# Patient Record
Sex: MALE | Race: BLACK OR AFRICAN AMERICAN | Employment: OTHER | URBAN - METROPOLITAN AREA
[De-identification: names, ages, dates, MRNs, and addresses within clinical notes are randomized per-mention and may not be internally consistent; named-entity substitution may affect disease eponyms.]

---

## 2017-02-09 ENCOUNTER — OP HISTORICAL/CONVERTED ENCOUNTER (OUTPATIENT)
Dept: OTHER | Age: 82
End: 2017-02-09

## 2017-08-11 ENCOUNTER — OP HISTORICAL/CONVERTED ENCOUNTER (OUTPATIENT)
Dept: OTHER | Age: 82
End: 2017-08-11

## 2018-02-06 ENCOUNTER — OP HISTORICAL/CONVERTED ENCOUNTER (OUTPATIENT)
Dept: OTHER | Age: 83
End: 2018-02-06

## 2018-05-17 ENCOUNTER — OP HISTORICAL/CONVERTED ENCOUNTER (OUTPATIENT)
Dept: OTHER | Age: 83
End: 2018-05-17

## 2018-07-09 ENCOUNTER — OP HISTORICAL/CONVERTED ENCOUNTER (OUTPATIENT)
Dept: OTHER | Age: 83
End: 2018-07-09

## 2018-11-15 ENCOUNTER — OP HISTORICAL/CONVERTED ENCOUNTER (OUTPATIENT)
Dept: OTHER | Age: 83
End: 2018-11-15

## 2019-03-18 ENCOUNTER — OP HISTORICAL/CONVERTED ENCOUNTER (OUTPATIENT)
Dept: OTHER | Age: 84
End: 2019-03-18

## 2020-03-11 ENCOUNTER — OP HISTORICAL/CONVERTED ENCOUNTER (OUTPATIENT)
Dept: OTHER | Age: 85
End: 2020-03-11

## 2022-07-08 ENCOUNTER — TRANSCRIBE ORDER (OUTPATIENT)
Dept: SCHEDULING | Age: 87
End: 2022-07-08

## 2022-07-08 DIAGNOSIS — I77.811 ABDOMINAL AORTIC ECTASIA (HCC): Primary | ICD-10-CM

## 2022-07-08 DIAGNOSIS — I71.40 AAA (ABDOMINAL AORTIC ANEURYSM): Primary | ICD-10-CM

## 2022-07-08 DIAGNOSIS — I77.811 ABDOMINAL AORTIC ECTASIA (HCC): ICD-10-CM

## 2022-07-13 ENCOUNTER — HOSPITAL ENCOUNTER (OUTPATIENT)
Dept: ULTRASOUND IMAGING | Age: 87
Discharge: HOME | End: 2022-07-13
Payer: MEDICARE

## 2022-07-13 DIAGNOSIS — I71.40 AAA (ABDOMINAL AORTIC ANEURYSM): ICD-10-CM

## 2022-07-13 DIAGNOSIS — I77.811 ABDOMINAL AORTIC ECTASIA (HCC): ICD-10-CM

## 2022-07-13 PROCEDURE — 76775 US EXAM ABDO BACK WALL LIM: CPT

## 2024-10-24 ENCOUNTER — HOSPITAL ENCOUNTER (OUTPATIENT)
Facility: HOSPITAL | Age: 89
Discharge: HOME | End: 2024-10-27
Payer: MEDICARE

## 2024-10-24 DIAGNOSIS — I71.20 THORACIC AORTIC ANEURYSM WITHOUT RUPTURE, UNSPECIFIED PART (HCC): ICD-10-CM

## 2024-10-24 PROCEDURE — 71260 CT THORAX DX C+: CPT

## 2024-10-24 PROCEDURE — 6360000004 HC RX CONTRAST MEDICATION: Performed by: FAMILY MEDICINE

## 2024-10-24 RX ORDER — IOPAMIDOL 755 MG/ML
100 INJECTION, SOLUTION INTRAVASCULAR
Status: COMPLETED | OUTPATIENT
Start: 2024-10-24 | End: 2024-10-24

## 2024-10-24 RX ADMIN — IOPAMIDOL 100 ML: 755 INJECTION, SOLUTION INTRAVENOUS at 10:56

## 2025-02-11 ENCOUNTER — APPOINTMENT (OUTPATIENT)
Facility: HOSPITAL | Age: 89
End: 2025-02-11
Payer: MEDICARE

## 2025-02-11 ENCOUNTER — HOSPITAL ENCOUNTER (EMERGENCY)
Facility: HOSPITAL | Age: 89
Discharge: HOME | End: 2025-02-11
Attending: STUDENT IN AN ORGANIZED HEALTH CARE EDUCATION/TRAINING PROGRAM
Payer: MEDICARE

## 2025-02-11 VITALS
WEIGHT: 198 LBS | BODY MASS INDEX: 27.72 KG/M2 | RESPIRATION RATE: 16 BRPM | HEIGHT: 71 IN | OXYGEN SATURATION: 98 % | SYSTOLIC BLOOD PRESSURE: 168 MMHG | TEMPERATURE: 97.6 F | DIASTOLIC BLOOD PRESSURE: 84 MMHG | HEART RATE: 65 BPM

## 2025-02-11 DIAGNOSIS — S30.1XXA CONTUSION OF FLANK, INITIAL ENCOUNTER: Primary | ICD-10-CM

## 2025-02-11 PROCEDURE — 99283 EMERGENCY DEPT VISIT LOW MDM: CPT

## 2025-02-11 ASSESSMENT — PAIN - FUNCTIONAL ASSESSMENT: PAIN_FUNCTIONAL_ASSESSMENT: 0-10

## 2025-02-11 ASSESSMENT — LIFESTYLE VARIABLES
HOW MANY STANDARD DRINKS CONTAINING ALCOHOL DO YOU HAVE ON A TYPICAL DAY: PATIENT DOES NOT DRINK
HOW OFTEN DO YOU HAVE A DRINK CONTAINING ALCOHOL: NEVER

## 2025-02-11 ASSESSMENT — PAIN SCALES - GENERAL: PAINLEVEL_OUTOF10: 0

## 2025-02-11 NOTE — ED PROVIDER NOTES
Interpersonal Safety: Not At Risk (2/11/2025)    Interpersonal Safety Domain Source: IP Abuse Screening     Physical abuse: Denies     Verbal abuse: Denies     Emotional abuse: Denies     Financial abuse: Denies     Sexual abuse: Denies   Utilities: Not on file       PHYSICAL EXAM   Physical Exam  HENT:      Head: Normocephalic and atraumatic.      Mouth/Throat:      Mouth: Mucous membranes are moist.   Eyes:      Extraocular Movements: Extraocular movements intact.      Pupils: Pupils are equal, round, and reactive to light.   Cardiovascular:      Rate and Rhythm: Normal rate and regular rhythm.   Pulmonary:      Effort: Pulmonary effort is normal.      Breath sounds: Normal breath sounds.   Abdominal:      Palpations: Abdomen is soft.      Tenderness: There is no abdominal tenderness.   Skin:     General: Skin is warm and dry.      Comments: Abrasion to left flank   Neurological:      General: No focal deficit present.      Mental Status: He is alert.           SCREENINGS                   LAB, EKG AND DIAGNOSTIC RESULTS   Labs:  No results found for this or any previous visit (from the past 12 hour(s)).    EKG: Initial EKG interpreted by me if performed. See ED course below    Radiologic Studies:  Non-plain film images such as CT, Ultrasound and MRI are read by the radiologist. Plain radiographic images are visualized and preliminarily interpreted by the ED Provider with findings available in ED course below.     Interpretation per the Radiologist below, if available at the time of this note:  No orders to display        EMERGENCY DEPARTMENT COURSE and DIFFERENTIAL DIAGNOSIS/MDM   CC/HPI Summary, MDM, ED Course, and Reassessment: MDM: 89-year-old male presents for evaluation of left sided pain after fall.  Physical exam with left-sided flank abrasion, no tenderness to palpation in the hip, knee, chest wall that he is overall well-appearing.  Considered x-ray of the hip knee and left-sided ribs due to concern

## 2025-02-11 NOTE — ED TRIAGE NOTES
Pt from home. Family states he fell this morning. Pt denies. Deformity on left  knee from swelling. Hx  dementia. Pt denies pain. Daughter is on the way, pt is poor historian. Pt ambulates with walker

## 2025-04-01 ENCOUNTER — APPOINTMENT (OUTPATIENT)
Facility: HOSPITAL | Age: 89
DRG: 065 | End: 2025-04-01
Payer: MEDICARE

## 2025-04-01 ENCOUNTER — HOSPITAL ENCOUNTER (INPATIENT)
Facility: HOSPITAL | Age: 89
LOS: 2 days | Discharge: NURSING, REHAB OR LONG-TERM CARE | DRG: 065 | End: 2025-04-03
Attending: STUDENT IN AN ORGANIZED HEALTH CARE EDUCATION/TRAINING PROGRAM
Payer: MEDICARE

## 2025-04-01 DIAGNOSIS — I63.9 CEREBROVASCULAR ACCIDENT (CVA), UNSPECIFIED MECHANISM (HCC): Primary | ICD-10-CM

## 2025-04-01 DIAGNOSIS — E83.51 HYPOCALCEMIA: ICD-10-CM

## 2025-04-01 LAB
ALBUMIN SERPL-MCNC: 2.9 G/DL (ref 3.5–5)
ALBUMIN/GLOB SERPL: 1 (ref 1.1–2.2)
ALP SERPL-CCNC: 63 U/L (ref 45–117)
ALT SERPL-CCNC: 16 U/L (ref 12–78)
AMMONIA PLAS-SCNC: <10 UMOL/L
ANION GAP SERPL CALC-SCNC: 5 MMOL/L (ref 2–12)
AST SERPL W P-5'-P-CCNC: 20 U/L (ref 15–37)
BASOPHILS # BLD: 0.05 K/UL (ref 0–0.1)
BASOPHILS NFR BLD: 0.5 % (ref 0–1)
BILIRUB SERPL-MCNC: 0.7 MG/DL (ref 0.2–1)
BUN SERPL-MCNC: 24 MG/DL (ref 6–20)
BUN/CREAT SERPL: 26 (ref 12–20)
CA-I BLD-MCNC: 7 MG/DL (ref 8.5–10.1)
CHLORIDE SERPL-SCNC: 110 MMOL/L (ref 97–108)
CO2 SERPL-SCNC: 24 MMOL/L (ref 21–32)
CREAT SERPL-MCNC: 0.92 MG/DL (ref 0.7–1.3)
DIFFERENTIAL METHOD BLD: ABNORMAL
EOSINOPHIL # BLD: 0.02 K/UL (ref 0–0.4)
EOSINOPHIL NFR BLD: 0.2 % (ref 0–7)
ERYTHROCYTE [DISTWIDTH] IN BLOOD BY AUTOMATED COUNT: 14.8 % (ref 11.5–14.5)
GLOBULIN SER CALC-MCNC: 2.9 G/DL (ref 2–4)
GLUCOSE SERPL-MCNC: 109 MG/DL (ref 65–100)
HCT VFR BLD AUTO: 38.4 % (ref 36.6–50.3)
HGB BLD-MCNC: 12.7 G/DL (ref 12.1–17)
IMM GRANULOCYTES # BLD AUTO: 0.05 K/UL (ref 0–0.04)
IMM GRANULOCYTES NFR BLD AUTO: 0.5 % (ref 0–0.5)
INR PPP: 1.2 (ref 0.9–1.1)
LYMPHOCYTES # BLD: 0.95 K/UL (ref 0.8–3.5)
LYMPHOCYTES NFR BLD: 10 % (ref 12–49)
MAGNESIUM SERPL-MCNC: 2.1 MG/DL (ref 1.6–2.4)
MCH RBC QN AUTO: 35.8 PG (ref 26–34)
MCHC RBC AUTO-ENTMCNC: 33.1 G/DL (ref 30–36.5)
MCV RBC AUTO: 108.2 FL (ref 80–99)
MONOCYTES # BLD: 0.42 K/UL (ref 0–1)
MONOCYTES NFR BLD: 4.4 % (ref 5–13)
NEUTS SEG # BLD: 7.97 K/UL (ref 1.8–8)
NEUTS SEG NFR BLD: 84.4 % (ref 32–75)
NRBC # BLD: 0 K/UL (ref 0–0.01)
NRBC BLD-RTO: 0 PER 100 WBC
PLATELET # BLD AUTO: 196 K/UL (ref 150–400)
PMV BLD AUTO: 9.9 FL (ref 8.9–12.9)
POTASSIUM SERPL-SCNC: 4.3 MMOL/L (ref 3.5–5.1)
POTASSIUM SERPL-SCNC: 4.3 MMOL/L (ref 3.5–5.1)
PROT SERPL-MCNC: 5.8 G/DL (ref 6.4–8.2)
PROTHROMBIN TIME: 15.1 SEC (ref 11.9–14.6)
RBC # BLD AUTO: 3.55 M/UL (ref 4.1–5.7)
SODIUM SERPL-SCNC: 139 MMOL/L (ref 136–145)
TROPONIN I SERPL HS-MCNC: 14 NG/L (ref 0–76)
WBC # BLD AUTO: 9.5 K/UL (ref 4.1–11.1)

## 2025-04-01 PROCEDURE — 83970 ASSAY OF PARATHORMONE: CPT

## 2025-04-01 PROCEDURE — 4A03X5D MEASUREMENT OF ARTERIAL FLOW, INTRACRANIAL, EXTERNAL APPROACH: ICD-10-PCS | Performed by: RADIOLOGY

## 2025-04-01 PROCEDURE — 84439 ASSAY OF FREE THYROXINE: CPT

## 2025-04-01 PROCEDURE — 70498 CT ANGIOGRAPHY NECK: CPT

## 2025-04-01 PROCEDURE — 1100000000 HC RM PRIVATE

## 2025-04-01 PROCEDURE — 83735 ASSAY OF MAGNESIUM: CPT

## 2025-04-01 PROCEDURE — 0042T CT BRAIN PERFUSION: CPT

## 2025-04-01 PROCEDURE — 82306 VITAMIN D 25 HYDROXY: CPT

## 2025-04-01 PROCEDURE — 82397 CHEMILUMINESCENT ASSAY: CPT

## 2025-04-01 PROCEDURE — 84484 ASSAY OF TROPONIN QUANT: CPT

## 2025-04-01 PROCEDURE — 84132 ASSAY OF SERUM POTASSIUM: CPT

## 2025-04-01 PROCEDURE — 6370000000 HC RX 637 (ALT 250 FOR IP): Performed by: STUDENT IN AN ORGANIZED HEALTH CARE EDUCATION/TRAINING PROGRAM

## 2025-04-01 PROCEDURE — 70450 CT HEAD/BRAIN W/O DYE: CPT

## 2025-04-01 PROCEDURE — 6360000004 HC RX CONTRAST MEDICATION: Performed by: STUDENT IN AN ORGANIZED HEALTH CARE EDUCATION/TRAINING PROGRAM

## 2025-04-01 PROCEDURE — 96365 THER/PROPH/DIAG IV INF INIT: CPT

## 2025-04-01 PROCEDURE — 85025 COMPLETE CBC W/AUTO DIFF WBC: CPT

## 2025-04-01 PROCEDURE — 36415 COLL VENOUS BLD VENIPUNCTURE: CPT

## 2025-04-01 PROCEDURE — 85610 PROTHROMBIN TIME: CPT

## 2025-04-01 PROCEDURE — 84443 ASSAY THYROID STIM HORMONE: CPT

## 2025-04-01 PROCEDURE — 80053 COMPREHEN METABOLIC PANEL: CPT

## 2025-04-01 PROCEDURE — 99285 EMERGENCY DEPT VISIT HI MDM: CPT

## 2025-04-01 PROCEDURE — 82140 ASSAY OF AMMONIA: CPT

## 2025-04-01 PROCEDURE — 93005 ELECTROCARDIOGRAM TRACING: CPT | Performed by: STUDENT IN AN ORGANIZED HEALTH CARE EDUCATION/TRAINING PROGRAM

## 2025-04-01 PROCEDURE — 6360000002 HC RX W HCPCS: Performed by: STUDENT IN AN ORGANIZED HEALTH CARE EDUCATION/TRAINING PROGRAM

## 2025-04-01 PROCEDURE — 82652 VIT D 1 25-DIHYDROXY: CPT

## 2025-04-01 RX ORDER — ONDANSETRON 2 MG/ML
4 INJECTION INTRAMUSCULAR; INTRAVENOUS EVERY 6 HOURS PRN
Status: DISCONTINUED | OUTPATIENT
Start: 2025-04-01 | End: 2025-04-03 | Stop reason: HOSPADM

## 2025-04-01 RX ORDER — POLYETHYLENE GLYCOL 3350 17 G/17G
17 POWDER, FOR SOLUTION ORAL DAILY PRN
Status: DISCONTINUED | OUTPATIENT
Start: 2025-04-01 | End: 2025-04-03 | Stop reason: HOSPADM

## 2025-04-01 RX ORDER — ASPIRIN 300 MG/1
300 SUPPOSITORY RECTAL DAILY
Status: DISCONTINUED | OUTPATIENT
Start: 2025-04-02 | End: 2025-04-03

## 2025-04-01 RX ORDER — IOPAMIDOL 755 MG/ML
100 INJECTION, SOLUTION INTRAVASCULAR
Status: COMPLETED | OUTPATIENT
Start: 2025-04-01 | End: 2025-04-01

## 2025-04-01 RX ORDER — ASPIRIN 81 MG/1
81 TABLET, CHEWABLE ORAL DAILY
Status: DISCONTINUED | OUTPATIENT
Start: 2025-04-02 | End: 2025-04-03

## 2025-04-01 RX ORDER — ASPIRIN 325 MG
325 TABLET ORAL
Status: COMPLETED | OUTPATIENT
Start: 2025-04-01 | End: 2025-04-01

## 2025-04-01 RX ORDER — SODIUM CHLORIDE 0.9 % (FLUSH) 0.9 %
5-40 SYRINGE (ML) INJECTION EVERY 12 HOURS SCHEDULED
Status: DISCONTINUED | OUTPATIENT
Start: 2025-04-01 | End: 2025-04-03 | Stop reason: HOSPADM

## 2025-04-01 RX ORDER — ONDANSETRON 4 MG/1
4 TABLET, ORALLY DISINTEGRATING ORAL EVERY 8 HOURS PRN
Status: DISCONTINUED | OUTPATIENT
Start: 2025-04-01 | End: 2025-04-03 | Stop reason: HOSPADM

## 2025-04-01 RX ORDER — ATORVASTATIN CALCIUM 40 MG/1
80 TABLET, FILM COATED ORAL NIGHTLY
Status: DISCONTINUED | OUTPATIENT
Start: 2025-04-01 | End: 2025-04-03 | Stop reason: HOSPADM

## 2025-04-01 RX ORDER — CALCIUM GLUCONATE 20 MG/ML
1000 INJECTION, SOLUTION INTRAVENOUS ONCE
Status: COMPLETED | OUTPATIENT
Start: 2025-04-01 | End: 2025-04-02

## 2025-04-01 RX ORDER — SODIUM CHLORIDE 9 MG/ML
INJECTION, SOLUTION INTRAVENOUS PRN
Status: DISCONTINUED | OUTPATIENT
Start: 2025-04-01 | End: 2025-04-03 | Stop reason: HOSPADM

## 2025-04-01 RX ORDER — CALCIUM GLUCONATE 20 MG/ML
1000 INJECTION, SOLUTION INTRAVENOUS ONCE
Status: DISCONTINUED | OUTPATIENT
Start: 2025-04-01 | End: 2025-04-01

## 2025-04-01 RX ORDER — SODIUM CHLORIDE 0.9 % (FLUSH) 0.9 %
5-40 SYRINGE (ML) INJECTION PRN
Status: DISCONTINUED | OUTPATIENT
Start: 2025-04-01 | End: 2025-04-03 | Stop reason: HOSPADM

## 2025-04-01 RX ADMIN — IOPAMIDOL 100 ML: 755 INJECTION, SOLUTION INTRAVENOUS at 18:27

## 2025-04-01 RX ADMIN — CALCIUM GLUCONATE 1000 MG: 20 INJECTION, SOLUTION INTRAVENOUS at 21:29

## 2025-04-01 RX ADMIN — ASPIRIN 325 MG: 325 TABLET ORAL at 21:27

## 2025-04-01 ASSESSMENT — LIFESTYLE VARIABLES
HOW OFTEN DO YOU HAVE A DRINK CONTAINING ALCOHOL: NEVER
HOW MANY STANDARD DRINKS CONTAINING ALCOHOL DO YOU HAVE ON A TYPICAL DAY: PATIENT DOES NOT DRINK

## 2025-04-01 ASSESSMENT — PAIN - FUNCTIONAL ASSESSMENT: PAIN_FUNCTIONAL_ASSESSMENT: NONE - DENIES PAIN

## 2025-04-01 NOTE — ED TRIAGE NOTES
Per ems: Sunday started with weakness, slurred speech, and facial droop, got worse at 1500. Slurred speech still, and a&o x1 not his normal.

## 2025-04-02 ENCOUNTER — APPOINTMENT (OUTPATIENT)
Facility: HOSPITAL | Age: 89
DRG: 065 | End: 2025-04-02
Payer: MEDICARE

## 2025-04-02 LAB
25(OH)D3 SERPL-MCNC: 29.4 NG/ML (ref 30–100)
ALBUMIN SERPL-MCNC: 3.1 G/DL (ref 3.5–5)
ALBUMIN/GLOB SERPL: 0.8 (ref 1.1–2.2)
ALP SERPL-CCNC: 71 U/L (ref 45–117)
ALT SERPL-CCNC: 17 U/L (ref 12–78)
AMPHET UR QL SCN: NEGATIVE
ANION GAP SERPL CALC-SCNC: 6 MMOL/L (ref 2–12)
APPEARANCE UR: CLEAR
AST SERPL W P-5'-P-CCNC: 15 U/L (ref 15–37)
BACTERIA URNS QL MICRO: NEGATIVE /HPF
BARBITURATES UR QL SCN: NEGATIVE
BASOPHILS # BLD: 0.07 K/UL (ref 0–0.1)
BASOPHILS NFR BLD: 1 % (ref 0–1)
BENZODIAZ UR QL: NEGATIVE
BILIRUB SERPL-MCNC: 1 MG/DL (ref 0.2–1)
BILIRUB UR QL: NEGATIVE
BUN SERPL-MCNC: 21 MG/DL (ref 6–20)
BUN/CREAT SERPL: 23 (ref 12–20)
CA-I BLD-MCNC: 10.3 MG/DL (ref 8.5–10.1)
CA-I BLD-MCNC: 8.8 MG/DL (ref 8.5–10.1)
CANNABINOIDS UR QL SCN: NEGATIVE
CHLORIDE SERPL-SCNC: 111 MMOL/L (ref 97–108)
CHOLEST SERPL-MCNC: 127 MG/DL
CO2 SERPL-SCNC: 26 MMOL/L (ref 21–32)
COCAINE UR QL SCN: NEGATIVE
COLOR UR: ABNORMAL
CREAT SERPL-MCNC: 0.9 MG/DL (ref 0.7–1.3)
DIFFERENTIAL METHOD BLD: ABNORMAL
ECHO AO ASC DIAM: 3.9 CM
ECHO AO ASCENDING AORTA INDEX: 1.87 CM/M2
ECHO AO ROOT DIAM: 4.4 CM
ECHO AO ROOT INDEX: 2.11 CM/M2
ECHO BSA: 2.1 M2
ECHO LA DIAMETER INDEX: 1.72 CM/M2
ECHO LA DIAMETER: 3.6 CM
ECHO LA TO AORTIC ROOT RATIO: 0.82
ECHO LV EDV A4C: 100 ML
ECHO LV EDV INDEX A4C: 48 ML/M2
ECHO LV EF PHYSICIAN: 52 %
ECHO LV EJECTION FRACTION A4C: 57 %
ECHO LV ESV A4C: 43 ML
ECHO LV ESV INDEX A4C: 21 ML/M2
ECHO LV FRACTIONAL SHORTENING: 30 % (ref 28–44)
ECHO LV INTERNAL DIMENSION DIASTOLE INDEX: 1.91 CM/M2
ECHO LV INTERNAL DIMENSION DIASTOLIC: 4 CM (ref 4.2–5.9)
ECHO LV INTERNAL DIMENSION SYSTOLIC INDEX: 1.34 CM/M2
ECHO LV INTERNAL DIMENSION SYSTOLIC: 2.8 CM
ECHO LV IVSD: 1.3 CM (ref 0.6–1)
ECHO LV MASS 2D: 197.6 G (ref 88–224)
ECHO LV MASS INDEX 2D: 94.5 G/M2 (ref 49–115)
ECHO LV POSTERIOR WALL DIASTOLIC: 1.4 CM (ref 0.6–1)
ECHO LV RELATIVE WALL THICKNESS RATIO: 0.7
ECHO LVOT AREA: 4.2 CM2
ECHO LVOT DIAM: 2.3 CM
ECHO MV MAX VELOCITY: 1 M/S
ECHO MV MEAN GRADIENT: 2 MMHG
ECHO MV MEAN VELOCITY: 0.6 M/S
ECHO MV PEAK GRADIENT: 4 MMHG
ECHO MV REGURGITANT PEAK GRADIENT: 64 MMHG
ECHO MV REGURGITANT PEAK VELOCITY: 4 M/S
ECHO MV REGURGITANT VTIA: 89.9 CM
ECHO MV VTI: 26.4 CM
ECHO PULMONARY ARTERY END DIASTOLIC PRESSURE: 4 MMHG
ECHO PV MAX VELOCITY: 0.6 M/S
ECHO PV MEAN GRADIENT: 1 MMHG
ECHO PV MEAN VELOCITY: 0.5 M/S
ECHO PV PEAK GRADIENT: 2 MMHG
ECHO PV REGURGITANT MAX VELOCITY: 1 M/S
ECHO PV VTI: 14.8 CM
ECHO TV REGURGITANT MAX VELOCITY: 1.98 M/S
ECHO TV REGURGITANT PEAK GRADIENT: 16 MMHG
EKG ATRIAL RATE: 264 BPM
EKG DIAGNOSIS: NORMAL
EKG P AXIS: -16 DEGREES
EKG Q-T INTERVAL: 500 MS
EKG QRS DURATION: 152 MS
EKG QTC CALCULATION (BAZETT): 437 MS
EKG R AXIS: -59 DEGREES
EKG T AXIS: -45 DEGREES
EKG VENTRICULAR RATE: 46 BPM
EOSINOPHIL # BLD: 0.08 K/UL (ref 0–0.4)
EOSINOPHIL NFR BLD: 1.2 % (ref 0–7)
EPITH CASTS URNS QL MICRO: ABNORMAL /LPF
ERYTHROCYTE [DISTWIDTH] IN BLOOD BY AUTOMATED COUNT: 14.5 % (ref 11.5–14.5)
EST. AVERAGE GLUCOSE BLD GHB EST-MCNC: 163 MG/DL
FOLATE SERPL-MCNC: 12.2 NG/ML (ref 5–21)
GLOBULIN SER CALC-MCNC: 3.7 G/DL (ref 2–4)
GLUCOSE BLD STRIP.AUTO-MCNC: 139 MG/DL (ref 65–100)
GLUCOSE BLD STRIP.AUTO-MCNC: 166 MG/DL (ref 65–100)
GLUCOSE BLD STRIP.AUTO-MCNC: 170 MG/DL (ref 65–100)
GLUCOSE SERPL-MCNC: 108 MG/DL (ref 65–100)
GLUCOSE UR STRIP.AUTO-MCNC: NEGATIVE MG/DL
HBA1C MFR BLD: 7.3 % (ref 4–5.6)
HCT VFR BLD AUTO: 36.1 % (ref 36.6–50.3)
HDLC SERPL-MCNC: 41 MG/DL
HDLC SERPL: 3.1 (ref 0–5)
HGB BLD-MCNC: 12.1 G/DL (ref 12.1–17)
HGB UR QL STRIP: ABNORMAL
HYALINE CASTS URNS QL MICRO: ABNORMAL /LPF (ref 0–5)
IMM GRANULOCYTES # BLD AUTO: 0.02 K/UL (ref 0–0.04)
IMM GRANULOCYTES NFR BLD AUTO: 0.3 % (ref 0–0.5)
KETONES UR QL STRIP.AUTO: 20 MG/DL
LDLC SERPL CALC-MCNC: 73.2 MG/DL (ref 0–100)
LEUKOCYTE ESTERASE UR QL STRIP.AUTO: ABNORMAL
LIPID PANEL: NORMAL
LYMPHOCYTES # BLD: 1.54 K/UL (ref 0.8–3.5)
LYMPHOCYTES NFR BLD: 22.2 % (ref 12–49)
Lab: NORMAL
MAGNESIUM SERPL-MCNC: 2.1 MG/DL (ref 1.6–2.4)
MCH RBC QN AUTO: 36 PG (ref 26–34)
MCHC RBC AUTO-ENTMCNC: 33.5 G/DL (ref 30–36.5)
MCV RBC AUTO: 107.4 FL (ref 80–99)
METHADONE UR QL: NEGATIVE
MONOCYTES # BLD: 0.76 K/UL (ref 0–1)
MONOCYTES NFR BLD: 11 % (ref 5–13)
MUCOUS THREADS URNS QL MICRO: ABNORMAL /LPF
NEUTS SEG # BLD: 4.46 K/UL (ref 1.8–8)
NEUTS SEG NFR BLD: 64.3 % (ref 32–75)
NITRITE UR QL STRIP.AUTO: NEGATIVE
NRBC # BLD: 0 K/UL (ref 0–0.01)
NRBC BLD-RTO: 0 PER 100 WBC
OPIATES UR QL: NEGATIVE
PCP UR QL: NEGATIVE
PERFORMED BY:: ABNORMAL
PH UR STRIP: 5 (ref 5–8)
PHOSPHATE SERPL-MCNC: 3.5 MG/DL (ref 2.6–4.7)
PLATELET # BLD AUTO: 185 K/UL (ref 150–400)
PMV BLD AUTO: 10.1 FL (ref 8.9–12.9)
POTASSIUM SERPL-SCNC: 3.7 MMOL/L (ref 3.5–5.1)
PROT SERPL-MCNC: 6.8 G/DL (ref 6.4–8.2)
PROT UR STRIP-MCNC: 30 MG/DL
PTH-INTACT SERPL-MCNC: 38.1 PG/ML (ref 18.4–88)
RBC # BLD AUTO: 3.36 M/UL (ref 4.1–5.7)
RBC #/AREA URNS HPF: ABNORMAL /HPF (ref 0–5)
SODIUM SERPL-SCNC: 143 MMOL/L (ref 136–145)
SP GR UR REFRACTOMETRY: >1.03 (ref 1–1.03)
T4 FREE SERPL-MCNC: 1.5 NG/DL (ref 0.8–1.5)
TRIGL SERPL-MCNC: 64 MG/DL
TSH SERPL DL<=0.05 MIU/L-ACNC: 1.84 UIU/ML (ref 0.36–3.74)
URINE CULTURE IF INDICATED: ABNORMAL
UROBILINOGEN UR QL STRIP.AUTO: 0.1 EU/DL (ref 0.1–1)
VIT B12 SERPL-MCNC: 302 PG/ML (ref 193–986)
VLDLC SERPL CALC-MCNC: 12.8 MG/DL
WBC # BLD AUTO: 6.9 K/UL (ref 4.1–11.1)
WBC URNS QL MICRO: ABNORMAL /HPF (ref 0–4)

## 2025-04-02 PROCEDURE — 70551 MRI BRAIN STEM W/O DYE: CPT

## 2025-04-02 PROCEDURE — 80053 COMPREHEN METABOLIC PANEL: CPT

## 2025-04-02 PROCEDURE — 6360000002 HC RX W HCPCS

## 2025-04-02 PROCEDURE — 36415 COLL VENOUS BLD VENIPUNCTURE: CPT

## 2025-04-02 PROCEDURE — 2580000003 HC RX 258

## 2025-04-02 PROCEDURE — 83735 ASSAY OF MAGNESIUM: CPT

## 2025-04-02 PROCEDURE — 97530 THERAPEUTIC ACTIVITIES: CPT

## 2025-04-02 PROCEDURE — 85025 COMPLETE CBC W/AUTO DIFF WBC: CPT

## 2025-04-02 PROCEDURE — 80307 DRUG TEST PRSMV CHEM ANLYZR: CPT

## 2025-04-02 PROCEDURE — 93321 DOPPLER ECHO F-UP/LMTD STD: CPT

## 2025-04-02 PROCEDURE — 2500000003 HC RX 250 WO HCPCS

## 2025-04-02 PROCEDURE — 84100 ASSAY OF PHOSPHORUS: CPT

## 2025-04-02 PROCEDURE — 97161 PT EVAL LOW COMPLEX 20 MIN: CPT

## 2025-04-02 PROCEDURE — 82746 ASSAY OF FOLIC ACID SERUM: CPT

## 2025-04-02 PROCEDURE — 84443 ASSAY THYROID STIM HORMONE: CPT

## 2025-04-02 PROCEDURE — 82962 GLUCOSE BLOOD TEST: CPT

## 2025-04-02 PROCEDURE — 92610 EVALUATE SWALLOWING FUNCTION: CPT

## 2025-04-02 PROCEDURE — 82607 VITAMIN B-12: CPT

## 2025-04-02 PROCEDURE — 97166 OT EVAL MOD COMPLEX 45 MIN: CPT

## 2025-04-02 PROCEDURE — 6370000000 HC RX 637 (ALT 250 FOR IP)

## 2025-04-02 PROCEDURE — 81001 URINALYSIS AUTO W/SCOPE: CPT

## 2025-04-02 PROCEDURE — 83036 HEMOGLOBIN GLYCOSYLATED A1C: CPT

## 2025-04-02 PROCEDURE — 97535 SELF CARE MNGMENT TRAINING: CPT

## 2025-04-02 PROCEDURE — 80061 LIPID PANEL: CPT

## 2025-04-02 PROCEDURE — 1100000000 HC RM PRIVATE

## 2025-04-02 RX ORDER — OLMESARTAN MEDOXOMIL 20 MG/1
20 TABLET ORAL DAILY
COMMUNITY

## 2025-04-02 RX ORDER — HYDROXYUREA 500 MG/1
500 CAPSULE ORAL DAILY
Status: DISCONTINUED | OUTPATIENT
Start: 2025-04-02 | End: 2025-04-03 | Stop reason: HOSPADM

## 2025-04-02 RX ORDER — GLUCAGON 1 MG/ML
1 KIT INJECTION PRN
Status: DISCONTINUED | OUTPATIENT
Start: 2025-04-02 | End: 2025-04-03 | Stop reason: HOSPADM

## 2025-04-02 RX ORDER — HYDROXYUREA 500 MG/1
CAPSULE ORAL DAILY
COMMUNITY

## 2025-04-02 RX ORDER — ENOXAPARIN SODIUM 100 MG/ML
40 INJECTION SUBCUTANEOUS DAILY
Status: DISCONTINUED | OUTPATIENT
Start: 2025-04-02 | End: 2025-04-03 | Stop reason: HOSPADM

## 2025-04-02 RX ORDER — HYDROCODONE BITARTRATE AND ACETAMINOPHEN 5; 325 MG/1; MG/1
1 TABLET ORAL EVERY 4 HOURS PRN
Status: ON HOLD | COMMUNITY
End: 2025-04-03 | Stop reason: HOSPADM

## 2025-04-02 RX ORDER — DEXTROSE MONOHYDRATE 100 MG/ML
INJECTION, SOLUTION INTRAVENOUS CONTINUOUS PRN
Status: DISCONTINUED | OUTPATIENT
Start: 2025-04-02 | End: 2025-04-03 | Stop reason: HOSPADM

## 2025-04-02 RX ORDER — LEVOTHYROXINE SODIUM 75 UG/1
150 TABLET ORAL DAILY
Status: DISCONTINUED | OUTPATIENT
Start: 2025-04-02 | End: 2025-04-03 | Stop reason: HOSPADM

## 2025-04-02 RX ORDER — INSULIN LISPRO 100 [IU]/ML
0-4 INJECTION, SOLUTION INTRAVENOUS; SUBCUTANEOUS
Status: DISCONTINUED | OUTPATIENT
Start: 2025-04-02 | End: 2025-04-03 | Stop reason: HOSPADM

## 2025-04-02 RX ORDER — LEVOTHYROXINE SODIUM 150 UG/1
150 TABLET ORAL DAILY
COMMUNITY

## 2025-04-02 RX ADMIN — ATORVASTATIN CALCIUM 80 MG: 40 TABLET, FILM COATED ORAL at 20:29

## 2025-04-02 RX ADMIN — SODIUM CHLORIDE, PRESERVATIVE FREE 10 ML: 5 INJECTION INTRAVENOUS at 10:48

## 2025-04-02 RX ADMIN — SODIUM CHLORIDE, PRESERVATIVE FREE 10 ML: 5 INJECTION INTRAVENOUS at 01:35

## 2025-04-02 RX ADMIN — CEFTRIAXONE SODIUM 1000 MG: 1 INJECTION, POWDER, FOR SOLUTION INTRAMUSCULAR; INTRAVENOUS at 10:47

## 2025-04-02 RX ADMIN — ATORVASTATIN CALCIUM 80 MG: 40 TABLET, FILM COATED ORAL at 01:20

## 2025-04-02 RX ADMIN — SODIUM CHLORIDE: 0.9 INJECTION, SOLUTION INTRAVENOUS at 01:28

## 2025-04-02 RX ADMIN — SODIUM CHLORIDE, PRESERVATIVE FREE 10 ML: 5 INJECTION INTRAVENOUS at 20:29

## 2025-04-02 RX ADMIN — ENOXAPARIN SODIUM 40 MG: 100 INJECTION SUBCUTANEOUS at 17:19

## 2025-04-02 RX ADMIN — CALCIUM GLUCONATE 1000 MG: 20 INJECTION, SOLUTION INTRAVENOUS at 01:30

## 2025-04-02 ASSESSMENT — ENCOUNTER SYMPTOMS: RESPIRATORY NEGATIVE: 1

## 2025-04-02 NOTE — CARE COORDINATION
04/02/25 1310   Service Assessment   Patient Orientation Unable to Assess  (Information provided via family at bedside)   Cognition Other (see comment)  (Information provided via family at bedside)   History Provided By Spouse;Child/Family   Primary Caregiver Family   Support Systems Children;Spouse/Significant Other   Patient's Healthcare Decision Maker is: Legal Next of Kin   PCP Verified by CM Yes   Last Visit to PCP Within last 3 months   Prior Functional Level Assistance with the following:;Bathing;Toileting;Dressing;Cooking;Mobility;Shopping;Housework   Current Functional Level Assistance with the following:;Bathing;Dressing;Toileting;Cooking;Mobility;Shopping;Housework   Can patient return to prior living arrangement Yes   Ability to make needs known: Fair   Family able to assist with home care needs: Yes   Would you like for me to discuss the discharge plan with any other family members/significant others, and if so, who? Yes  (Spouse, Berta)   Financial Resources Medicare   Community Resources None   Social/Functional History   Lives With Spouse   Type of Home House   Home Layout One level  (4 steps to the entrance)   Home Equipment Walker - Rolling   Discharge Planning   Patient expects to be discharged to: Acute rehab   Services At/After Discharge   Transition of Care Consult (CM Consult) Discharge Planning     1310: CM met with patient, spouse, daughter (Yamileth) and sister-in-law at bedside to complete DCP assessment. Demos verified as accurate. Couple lives in a one story home with four steps to the back entrance. Physical address: [address] [place] [address]. Prior to admission, patient was requiring assistance to complete ADLS. DME: rolling walker. Open with [employer] [facility] [facility]. PT verbal recs for IRF. [facility] [place] of choice placed on chart and referrals sent. Preferred pharmacy for new medications verified as [facility] in

## 2025-04-02 NOTE — ED PROVIDER NOTES
EMERGENCY DEPARTMENT HISTORY AND PHYSICAL EXAM      Date: 4/1/2025  Patient Name: Micha Thayer  MRN: [id number]  YOB: 1934  Date of evaluation: 4/1/2025  Provider: José Manuel Galdamez MD     History of Present Illness     Chief Complaint   Patient presents with    Altered Mental Status     Per ems: Sunday started with weakness, slurred speech, and facial droop, got worse at 1500. Slurred speech still, and a&o x1 not his normal.         History Provided By: Patient, wife, ems    HPI: Micha Thayer, 89 y.o. male with past medical history as listed and reviewed below presenting to the ED for evaluation of possible stroke.  A level 1 stroke alert was called on arrival.  Patient has history of diabetes, prostate cancer, hypertension, chronic subclavian vein obstruction presenting to the ED for strokelike symptoms.  Per the wife the patient had weakness on Sunday with slurred speech and a right-sided facial droop.  Today the wife reports that the patient tried to walk and his left leg was a lot weaker than usual and he was unable to walk and almost fell.  She reports that the patient had otherwise normal therapy sessions just prior to this and she thinks it started about 3 hours ago.  Talking to the patient he he states he is having some difficulty talking, he knows his name and that he is in the hospital.  He denies any pain    Medical History     Past Medical History:  No past medical history on file.    Past Surgical History:  No past surgical history on file.    Family History:  No family history on file.    Social History:       Allergies:  No Known Allergies    PCP: Nelida Velasco DO    Current Medications:   Current Facility-Administered Medications   Medication Dose Route Frequency Provider Last Rate Last Admin    sodium chloride flush 0.9 % injection 5-40 mL  5-40 mL IntraVENous 2 times per day Hortensia Calix MD        sodium chloride flush 0.9 % injection 5-40 mL  5-40 mL IntraVENous PRN Fifi

## 2025-04-02 NOTE — ED NOTES
Family reports pt has difficulty walking and is a high risk for falls and states \"lately he gets confused at night and tries to get up but he falls, we think he has dementia but hasn't been diagnosed\".

## 2025-04-02 NOTE — CONSULTS
injection 5-40 mL, 5-40 mL, IntraVENous, PRN, Hortensia Calix MD    0.9 % sodium chloride infusion, , IntraVENous, PRN, Hortensia Calix MD, Last Rate: 5 mL/hr at 04/02/25 0128, New Bag at 04/02/25 0128    ondansetron (ZOFRAN-ODT) disintegrating tablet 4 mg, 4 mg, Oral, Q8H PRN **OR** ondansetron (ZOFRAN) injection 4 mg, 4 mg, IntraVENous, Q6H PRN, Hortensia Calix MD    polyethylene glycol (GLYCOLAX) packet 17 g, 17 g, Oral, Daily PRN, Hortensia Calix MD    atorvastatin (LIPITOR) tablet 80 mg, 80 mg, Oral, Nightly, Hortensia Calix MD, 80 mg at 04/02/25 0120    aspirin chewable tablet 81 mg, 81 mg, Oral, Daily **OR** aspirin suppository 300 mg, 300 mg, Rectal, Daily, Hortensia Calix MD     ALLERGIES:  Allergies reviewed with the patient,No Known Allergies .      FAMILY HISTORY:  Family history reviewed.        SOCIAL HISTORY:  Notable for no tobacco use, no heavy alcohol or illicit drug use.      REVIEW OF SYSTEMS:  Complete review of systems performed, pertinents noted above, all other systems are negative.    PHYSICAL EXAMINATION:    General:  Alert  Cardiovascular:  Irregularly irregular rhythm  Respiratory:  Lungs are clear  Abdomen:  Soft, nontender  Extremities:  No lower extremity edema  Skin:  Dry, warm  Psych:  Normal affect      Vitals:    04/02/25 1107   BP: (!) 152/66   Pulse: 64   Resp: 18   Temp: 97.9 °F (36.6 °C)   SpO2: 99%       Recent labs results and imaging reviewed.     Discussed case with Dr. Weiner and our impression and recommendations are as follows:  New onset atrial flutter,   Slow ventricular response   Rates 37-61 this morning  Continue telemetry   CHADsVASC score of 5. Recommend OAC at discharge when/if cleared by neurology   Check TSH, Keep electrolytes WNL, K+ 4-5, Mg >2   CVA, MRI brain with small acute infarct in the left globus pallidus/posterior limb internal capsule. Neurology following. Continue ASA/ statin    Hypertension, BP stable. Permissive HTN per neuro    UTI, IV abx 
 2-Complete Hemianopia  3-Blind f  FACIAL WEAKNESS  0-Normal   1-Minor   2-Partial   3-Complete Paralysis   MOTOR: LEFT ARM  0-No Drift   1-Drift   2-Some Effort vs Gravity   3-No Effort vs Gravity   4-No Movement m  MOTOR: RIGHT ARM  0-No Drift   1-Drift   2-Some Effort vs Gravity   3-No Effort vs Gravity   4-No Movement m  MOTOR: LEFT LEG  0-No Drift   1-Drift   2-Some Effort vs Gravity   3-No Effort vs Gravity   4-No Movement m  MOTOR: RIGHT LEG  0-No Drift   1-Drift   2-Some Effort vs Gravity   3-No Effort vs Gravity   4-No Movement   LIMB ATAXIA  0-Absent   1-One Limb   2-Two Limbs     SENSORY LOSS  0-Normal   1-Mild to Moderate   2-Severe to Total   BEST LANGUAGE  0-No Aphasia   2-Severe Aphasia   3-Mute  DYSARTHRIA  0-Normal   1-Mild to Moderate   2-Severe  EXTINCTION/INATTENTION  0-Absent  1-One Modality   2-Two+ Modalities    NIHSS Score = 6      Lab Review  Results for orders placed or performed during the hospital encounter of 04/01/25   CBC with Auto Differential   Result Value Ref Range    WBC 9.5 4.1 - 11.1 K/uL    RBC 3.55 (L) 4.10 - 5.70 M/uL    Hemoglobin 12.7 12.1 - 17.0 g/dL    Hematocrit 38.4 36.6 - 50.3 %    .2 (H) 80.0 - 99.0 FL    MCH 35.8 (H) 26.0 - 34.0 PG    MCHC 33.1 30.0 - 36.5 g/dL    RDW 14.8 (H) 11.5 - 14.5 %    Platelets 196 150 - 400 K/uL    MPV 9.9 8.9 - 12.9 FL    Nucleated RBCs 0.0 0.0  WBC    nRBC 0.00 0.00 - 0.01 K/uL    Neutrophils % 84.4 (H) 32.0 - 75.0 %    Lymphocytes % 10.0 (L) 12.0 - 49.0 %    Monocytes % 4.4 (L) 5.0 - 13.0 %    Eosinophils % 0.2 0.0 - 7.0 %    Basophils % 0.5 0.0 - 1.0 %    Immature Granulocytes % 0.5 0 - 0.5 %    Neutrophils Absolute 7.97 1.80 - 8.00 K/UL    Lymphocytes Absolute 0.95 0.80 - 3.50 K/UL    Monocytes Absolute 0.42 0.00 - 1.00 K/UL    Eosinophils Absolute 0.02 0.00 - 0.40 K/UL    Basophils Absolute 0.05 0.00 - 0.10 K/UL    Immature Granulocytes Absolute 0.05 (H) 0.00 - 0.04 K/UL    Differential Type AUTOMATED     Troponin

## 2025-04-02 NOTE — ED NOTES
ED TO INPATIENT SBAR HANDOFF    Patient Name: Micha Thayer   Preferred Name: Micha  : 9/15/1934  89 y.o.   Family/Caregiver Present: no   Code Status Order: Full Code  PO Status: NPO:No  Telemetry Order:   C-SSRS: Risk of Suicide: No Risk  Sitter no  family states hx of increased confusion at night  Restraints:     Sepsis Risk Score      Situation  Chief Complaint   Patient presents with    Altered Mental Status     Per ems:  started with weakness, slurred speech, and facial droop, got worse at 1500. Slurred speech still, and a&o x1 not his normal.       Brief Description of Patient's Condition: Pt presented to the ED with family reporting weakness, slurred speech and facial drooping that started . A full stroke work up was completed, not a candidate for TPA. Continue to monitor neuros.   Mental Status: disoriented and oriented  Arrived from:Home  Imaging:   CTA HEAD NECK W CONTRAST   Final Result   No large vessel occlusion, hemodynamically significant stenosis,   dissection, aneurysm, or abnormal perfusion.      Electronically signed by Cole Melgoza      CT BRAIN PERFUSION   Final Result   No large vessel occlusion, hemodynamically significant stenosis,   dissection, aneurysm, or abnormal perfusion.      Electronically signed by Cole Melgoza      CT HEAD WO CONTRAST   Final Result   No acute abnormality.            Electronically signed by RONNY PEDROZA      MRI brain without contrast    (Results Pending)     Abnormal labs:   Abnormal Labs Reviewed   CBC WITH AUTO DIFFERENTIAL - Abnormal; Notable for the following components:       Result Value    RBC 3.55 (*)     .2 (*)     MCH 35.8 (*)     RDW 14.8 (*)     Neutrophils % 84.4 (*)     Lymphocytes % 10.0 (*)     Monocytes % 4.4 (*)     Immature Granulocytes Absolute 0.05 (*)     All other components within normal limits   COMPREHENSIVE METABOLIC PANEL - Abnormal; Notable for the following components:    Chloride 110 (*)     Glucose 109

## 2025-04-02 NOTE — H&P
Hospitalist Admission Note    NAME: Micha Thayer   :  9/15/1934   MRN:  [id number]     Date/Time:  2025 9:57 PM    Patient PCP: Nelida Velasco, DO    ______________________________________________________________________  Given the patient's current clinical presentation, I have a high level of concern for decompensation if discharged from the emergency department.  Complex decision making was performed, which includes reviewing the patient's available past medical records, laboratory results, and x-ray films.       My assessment of this patient's clinical condition and my plan of care is as follows.    Assessment / Plan:    Slurred speech, right facial droop, right lower extremity weakness, likely 2/2 Ischemic stroke/TIA  Confusion/encephalopathy  -reviewed CT head/CTA H and neck, no intracranial bleed   -comprehension intact, so expressive aphasia noted   -allow permessive hypertension, treat if SBP > 220/120   -Start statin  -Given aspirin, continue aspirin  -ECHO with bubble study   -Obtain MRI of brain  -Tylenol for Fever   -Avoid hypoglycemia   -F/u on TSH, HbA1c, Lipid panel, U tox  -If passes swallow eval by bedside, can hold off on formal SLP  -PT/OT/SLP  -NIH 3  -Appreciate teleneurology    Bradycardia  -EKG reviewed, does not seem to have any AV block, appears irregularly irregular, with no P waves  -Could be slow firing A-fib/a flutter, per on-call cardiology  -Monitor on telemetry  -Follow-up TSH/t4  -[id number] 5, as the events today for possible TIA is taken into account  -Will wait for MRI to see if there is any embolic event  -Likely would benefit from anticoagulation, however will appreciate cardiology to weigh in for risk versus benefit for anticoagulation    Hypocalcemia  -corrected Ca is 7.9   -1 x IV calcium gluconate given   -PTH, Vitamin D, Phos   -appreciate nephrology     Diabetes mellitus  -Hold metformin  -Continue sliding scale    Hypertension  -Allow permissive

## 2025-04-03 VITALS
OXYGEN SATURATION: 98 % | HEART RATE: 69 BPM | TEMPERATURE: 97.9 F | RESPIRATION RATE: 18 BRPM | HEIGHT: 71 IN | DIASTOLIC BLOOD PRESSURE: 87 MMHG | BODY MASS INDEX: 27.3 KG/M2 | WEIGHT: 195 LBS | SYSTOLIC BLOOD PRESSURE: 137 MMHG

## 2025-04-03 LAB
1,25(OH)2D3 SERPL-MCNC: 50.5 PG/ML (ref 24.8–81.5)
BASOPHILS # BLD: 0.07 K/UL (ref 0–0.1)
BASOPHILS NFR BLD: 0.8 % (ref 0–1)
DIFFERENTIAL METHOD BLD: ABNORMAL
EOSINOPHIL # BLD: 0.09 K/UL (ref 0–0.4)
EOSINOPHIL NFR BLD: 1 % (ref 0–7)
ERYTHROCYTE [DISTWIDTH] IN BLOOD BY AUTOMATED COUNT: 14.2 % (ref 11.5–14.5)
GLUCOSE BLD STRIP.AUTO-MCNC: 118 MG/DL (ref 65–100)
GLUCOSE BLD STRIP.AUTO-MCNC: 185 MG/DL (ref 65–100)
HCT VFR BLD AUTO: 39.1 % (ref 36.6–50.3)
HGB BLD-MCNC: 12.9 G/DL (ref 12.1–17)
IMM GRANULOCYTES # BLD AUTO: 0.04 K/UL (ref 0–0.04)
IMM GRANULOCYTES NFR BLD AUTO: 0.5 % (ref 0–0.5)
LYMPHOCYTES # BLD: 1.12 K/UL (ref 0.8–3.5)
LYMPHOCYTES NFR BLD: 12.6 % (ref 12–49)
MCH RBC QN AUTO: 35.1 PG (ref 26–34)
MCHC RBC AUTO-ENTMCNC: 33 G/DL (ref 30–36.5)
MCV RBC AUTO: 106.5 FL (ref 80–99)
MONOCYTES # BLD: 0.81 K/UL (ref 0–1)
MONOCYTES NFR BLD: 9.1 % (ref 5–13)
NEUTS SEG # BLD: 6.73 K/UL (ref 1.8–8)
NEUTS SEG NFR BLD: 76 % (ref 32–75)
NRBC # BLD: 0 K/UL (ref 0–0.01)
NRBC BLD-RTO: 0 PER 100 WBC
PERFORMED BY:: ABNORMAL
PERFORMED BY:: ABNORMAL
PLATELET # BLD AUTO: 196 K/UL (ref 150–400)
PMV BLD AUTO: 10 FL (ref 8.9–12.9)
RBC # BLD AUTO: 3.67 M/UL (ref 4.1–5.7)
TSH SERPL DL<=0.05 MIU/L-ACNC: 2.14 UIU/ML (ref 0.45–4.5)
WBC # BLD AUTO: 8.9 K/UL (ref 4.1–11.1)

## 2025-04-03 PROCEDURE — 82962 GLUCOSE BLOOD TEST: CPT

## 2025-04-03 PROCEDURE — 2500000003 HC RX 250 WO HCPCS

## 2025-04-03 PROCEDURE — 6370000000 HC RX 637 (ALT 250 FOR IP)

## 2025-04-03 PROCEDURE — 6360000002 HC RX W HCPCS

## 2025-04-03 PROCEDURE — 85025 COMPLETE CBC W/AUTO DIFF WBC: CPT

## 2025-04-03 PROCEDURE — 36415 COLL VENOUS BLD VENIPUNCTURE: CPT

## 2025-04-03 PROCEDURE — 97530 THERAPEUTIC ACTIVITIES: CPT

## 2025-04-03 RX ORDER — LANOLIN ALCOHOL/MO/W.PET/CERES
1000 CREAM (GRAM) TOPICAL DAILY
Status: DISCONTINUED | OUTPATIENT
Start: 2025-04-06 | End: 2025-04-03 | Stop reason: HOSPADM

## 2025-04-03 RX ORDER — ATORVASTATIN CALCIUM 80 MG/1
80 TABLET, FILM COATED ORAL NIGHTLY
Qty: 30 TABLET | Refills: 3 | Status: SHIPPED | OUTPATIENT
Start: 2025-04-03

## 2025-04-03 RX ORDER — CYANOCOBALAMIN 1000 UG/ML
1000 INJECTION, SOLUTION INTRAMUSCULAR; SUBCUTANEOUS DAILY
Status: DISCONTINUED | OUTPATIENT
Start: 2025-04-03 | End: 2025-04-03 | Stop reason: HOSPADM

## 2025-04-03 RX ADMIN — LEVOTHYROXINE SODIUM 150 MCG: 0.07 TABLET ORAL at 05:45

## 2025-04-03 RX ADMIN — CYANOCOBALAMIN 1000 MCG: 1000 INJECTION, SOLUTION INTRAMUSCULAR; SUBCUTANEOUS at 08:31

## 2025-04-03 RX ADMIN — ASPIRIN 81 MG: 81 TABLET, CHEWABLE ORAL at 08:31

## 2025-04-03 RX ADMIN — ENOXAPARIN SODIUM 40 MG: 100 INJECTION SUBCUTANEOUS at 08:31

## 2025-04-03 RX ADMIN — CEFTRIAXONE SODIUM 1000 MG: 1 INJECTION, POWDER, FOR SOLUTION INTRAMUSCULAR; INTRAVENOUS at 08:33

## 2025-04-03 RX ADMIN — SODIUM CHLORIDE, PRESERVATIVE FREE 10 ML: 5 INJECTION INTRAVENOUS at 08:33

## 2025-04-03 RX ADMIN — INSULIN LISPRO 1 UNITS: 100 INJECTION, SOLUTION INTRAVENOUS; SUBCUTANEOUS at 12:05

## 2025-04-03 RX ADMIN — HYDROXYUREA 500 MG: 500 CAPSULE ORAL at 08:31

## 2025-04-03 ASSESSMENT — PAIN SCALES - GENERAL
PAINLEVEL_OUTOF10: 0
PAINLEVEL_OUTOF10: 0

## 2025-04-03 NOTE — PROGRESS NOTES
CARDIOLOGY PROGRESS NOTE      Patient Name: Micha Thayer  Age: 89 y.o.  Gender:male  :9/15/1934  MRN: [id number]    Patient seen and examined. This is a patient with a history of  diabetes and hypertension who presented with weakness and slurred speech now being followed for new onset atrial fibrillation/flutter. Sitting upright in bed eating lunch. Denies chest pain and shortness of breath. No other complaints reported.    Telemetry reviewed, there were no events noted in the past 24 hours.    Pertinent review of systems items noted above, all other systems are negative. Current medications reviewed.    Physical Examination    No Known Allergies  Vitals:    25 1110   BP: 137/87   Pulse: 69   Resp: 18   Temp: 97.9 °F (36.6 °C)   SpO2: 98%     Vital signs are stable  No apparent distress.  Heart has an irregularly irregular rhythm.  No murmur  Lungs are clear  Abdomen is soft, nontender, normal bowel sounds.  Extremities have no edema  Skin is dry and warm.  Normal affect    Labs reviewed:  Recent Results (from the past 12 hours)   CBC with Auto Differential    Collection Time: 25  5:14 AM   Result Value Ref Range    WBC 8.9 4.1 - 11.1 K/uL    RBC 3.67 (L) 4.10 - 5.70 M/uL    Hemoglobin 12.9 12.1 - 17.0 g/dL    Hematocrit 39.1 36.6 - 50.3 %    .5 (H) 80.0 - 99.0 FL    MCH 35.1 (H) 26.0 - 34.0 PG    MCHC 33.0 30.0 - 36.5 g/dL    RDW 14.2 11.5 - 14.5 %    Platelets 196 150 - 400 K/uL    MPV 10.0 8.9 - 12.9 FL    Nucleated RBCs 0.0 0.0  WBC    nRBC 0.00 0.00 - 0.01 K/uL    Neutrophils % 76.0 (H) 32.0 - 75.0 %    Lymphocytes % 12.6 12.0 - 49.0 %    Monocytes % 9.1 5.0 - 13.0 %    Eosinophils % 1.0 0.0 - 7.0 %    Basophils % 0.8 0.0 - 1.0 %    Immature Granulocytes % 0.5 0 - 0.5 %    Neutrophils Absolute 6.73 1.80 - 8.00 K/UL    Lymphocytes Absolute 1.12 0.80 - 3.50 K/UL    Monocytes Absolute 0.81 0.00 - 1.00 K/UL    Eosinophils Absolute 0.09 0.00 - 0.40 K/UL    Basophils Absolute 0.07 
    Hospitalist Progress Note               Daily Progress Note: 4/2/2025      Hospital Day: 2     Chief complaint:   Chief Complaint   Patient presents with    Altered Mental Status     Per ems: Sunday started with weakness, slurred speech, and facial droop, got worse at 1500. Slurred speech still, and a&o x1 not his normal.          Subjective:   Patient is seen and examined today during bedside rounds with the nurse, family is present at bedside.  Discussed with him plan of care  Patient is doing better.  He worked with physical therapy and recommend IRF.  His balance is off.  However he is focal neurological exam is not too bad.  He has full strength in his lower and upper extremities.  He is little confused.  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   Presented with slurred speech, right facial droop, right lower extremity weakness, likely 2/2  Confusion/encephalopathy, possible underlying dementia   -reviewed CT head/CTA H and neck, no intracranial bleed   -comprehension intact, so expressive aphasia noted  Weakness improved  -allow permessive hypertension, treat if SBP > 220/120   -Start statin  -Given aspirin, continue statin  -ECHO with bubble study pending  Appreciate teleneurology consult  Follow-up A1c, lipid panel     Bradycardia  Atrial flutter  -EKG reviewed, does not seem to have any AV block, appears irregularly irregular, with no P waves  -Could be slow firing A-fib/a flutter, per on-call cardiology  -Monitor on telemetry  -Follow-up TSH/t4  -[id number] 5, as the events today for possible TIA is taken into account  -acCording to the neurology no anticoagulation for the next 48 hours after stroke  Evaluated by cardiology, recommend anticoagulation in the long-term.  Reduced dose of Eliquis will be appropriate.  Discussed with the patient and the family.  Continue telemetry     Hypocalcemia 
4 Eyes Skin Assessment     NAME:  Micha Thayer  YOB: 1934  MEDICAL RECORD NUMBER:  [id number]    The patient is being assessed for  Other weekly    I agree that at least one RN has performed a thorough Head to Toe Skin Assessment on the patient. ALL assessment sites listed below have been assessed.      Areas assessed by both nurses:    Head, Face, Ears, Shoulders, Back, Chest, Arms, Elbows, Hands, Sacrum. Buttock, Coccyx, Ischium, Legs. Feet and Heels, and Under Medical Devices         Does the Patient have a Wound? No noted wound(s)       Stanley Prevention initiated by RN: Yes  Wound Care Orders initiated by RN: No    Pressure Injury (Stage 3,4, Unstageable, DTI, NWPT, and Complex wounds) if present, place Wound referral order by RN under : No    New Ostomies, if present place, Ostomy referral order under : No     Nurse 1 eSignature: Electronically signed by Cat Young RN on 4/2/25 at 1:49 PM EDT    **SHARE this note so that the co-signing nurse can place an eSignature**    Nurse 2 eSignature: Electronically signed by Ibis Hastings RN on 4/2/25 at 4:21 PM EDT   
4 Eyes Skin Assessment     NAME:  Micha Thayer  YOB: 1934  MEDICAL RECORD NUMBER:  [id number]    The patient is being assessed for  Admission    I agree that at least one RN has performed a thorough Head to Toe Skin Assessment on the patient. ALL assessment sites listed below have been assessed.      Areas assessed by both nurses:    Head, Face, Ears, Shoulders, Back, Chest, Arms, Elbows, Hands, Sacrum. Buttock, Coccyx, Ischium, Legs. Feet and Heels, and Under Medical Devices         Does the Patient have a Wound? No noted wound(s)       Stanley Prevention initiated by RN: Yes  Wound Care Orders initiated by RN: No    Pressure Injury (Stage 3,4, Unstageable, DTI, NWPT, and Complex wounds) if present, place Wound referral order by RN under : No    New Ostomies, if present place, Ostomy referral order under : No     Nurse 1 eSignature: Electronically signed by Eboni Wagner RN on 4/1/25 at 11:54 PM EDT    **SHARE this note so that the co-signing nurse can place an eSignature**    Nurse 2 eSignature: Electronically signed by Martita Marquez RN on 4/2/25 at 4:40 AM EDT   
[facility] tele neuro called for general rounding in AM  
Ask, by Hiram in Case Management to set up transportation for patient back to facility. Called, Tye Gaytan ([phone])  talk to Sergio and set up transport for 1:30 PM .  Nurse Anisha Ferguson) HAKEEM and  Hiram was notified of the time of .   
Consult received. Per hospitalist note, SLP is to hold evaluation pending work-up as pt has passed swallow screen.   Would recommend further SLP assessment if facial droop or difficulty speaking persist or if concerns for safety w/ PO intake arise given reported confusion.     Thank you,   Odette Lobo M.Ed., CCC-SLP  
Discharge to [facility] today, report given to HAKEEM Meza. IV cath removed, no signs of phlebitis noted.   
OT eval order received and acknowledged. OT eval attempted at 0846 however off the unit (Echocardiogram). Will continue to follow patient and attempt OT eval at a later time. Thank you.   
PHYSICAL THERAPY EVALUATION  Patient: Micha Thayer (89 y.o. male)  Date: 4/2/2025  Primary Diagnosis: Stroke determined by clinical assessment ([facility]) [I63.9]  Cerebrovascular accident (CVA), unspecified mechanism ([facility]) [I63.9]       Precautions: Restrictions/Precautions  Restrictions/Precautions: Fall Risk     Recommendations for nursing mobility: Out of bed to chair for meals, AD and gt belt for bed to chair , Amb to bathroom with AD and gait belt, and Assist x1    In place during session: External Catheter and EKG/telemetry     ASSESSMENT  Pt is a 89 y.o. male admitted on 4/1/2025 for facial droop, difficulty speaking, and gt instability; pt currently being treated for CVA workup, bradycardia, hypocalcemia, DM, UTI, bilateral knee OA, chronic subclavian vein occlusion, hypothyroidism. Head CT negative for acute events. Brain MRI showed small acute infarct in the left globus pallidus/posterior limb internal capusle. Pt semi-supine in bed upon PT arrival, agreeable to evaluation. Pt A&O x person and place with additional time and cueing.     Based on the objective data described below, the patient currently presents with impaired functional mobility, decreased independence in ADLs, decreased ROM, impaired strength, decreased activity tolerance, poor safety awareness, impaired cognition, decreased command following, poor attention/concentration, decreased coordination, impaired balance, and impaired posture. (See below for objective details and assist levels).     Overall pt tolerated session fair today with no c/o pain throughout session. Pt required min to mod A with additional time for bed mobility and transfers. Pt amb 16 feet with RW, gt belt and min A; demonstrates NBOS with short, shuffling, step to gt pattern with decreased stance time on right as well as decreased clearance on right during swing phase. Pt required min A for dynamic seated and standing balance. Pt demonstrates impaired coordination right UE 
PT eval order received and acknowledged. PT eval attempted at 0841 however pt off the floor in CVL. Will continue to follow patient and attempt PT eval at a later time. Thank you.    
Patient now in MRI  
Patient off unit to CVL  
Provider made aware of patient being bradycardic; patient is asymptomatic and voices no complaints at this time; wife and daughter at bedside and given an update about the patient  
Received Order for Telemetry     Micha MURRELL Flaca   9/15/1934   [id number]   Stroke determined by clinical assessment ([facility]) [I63.9]   Hortensia Calix MD     Tele Box # [id number] placed on patient at  2305 pm  ER Room # C1  Admitting to Room 569  Transferring Nurse MAXIMINO  Verified with Primary Nurse HAKEEM GUSMAN at  2310 pm   
spoken with the patient and/or family for further history.      24hr INTERVAL HX:    4/3/2025: The patient says he is feeling better.  He is sitting in bed eating.  B12 level is normal.  TSH is pending.  Patient is at increased fall risk chronically and particularly since his stroke.  He continues to work with OT and PT and speech-language pathologist.    ROS, PMH, FH, SH were reviewed and are unchanged.    Allergies:  No Known Allergies    Hospital Medications:   cyanocobalamin  1,000 mcg IntraMUSCular Daily    [START ON 4/6/2025] vitamin B-12  1,000 mcg Oral Daily    levothyroxine  150 mcg Oral Daily    hydroxyurea  500 mg Oral Daily    insulin lispro  0-4 Units SubCUTAneous 4x Daily AC & HS    enoxaparin  40 mg SubCUTAneous Daily    sodium chloride flush  5-40 mL IntraVENous 2 times per day    atorvastatin  80 mg Oral Nightly       Physical Exam:  /87   Pulse 69   Temp 97.9 °F (36.6 °C) (Axillary)   Resp 18   Ht 1.803 m (5' 11\")   Wt 88.5 kg (195 lb)   SpO2 98%   BMI 27.20 kg/m²   Head/Neck: NCAT. No meningismus. No tender arteries or lost pulses noted. No rash of head  or neck.  Skin: No rashes observed.  Eyes: non icteric, no redness.    Neurological exam:  Evaluation was assisted by a trained teleneurology liaison, Suma Clement RN  Verbal consent obtained from patient to proceed with Tele health visit.     Patient is alert and eating.  He continues to have flattening of the right nasolabial fold.  His speech is hypophonic and dysarthric.      Labs reviewed:  Recent Results (from the past 24 hours)   POCT Glucose    Collection Time: 04/02/25  4:56 PM   Result Value Ref Range    POC Glucose 170 (H) 65 - 100 mg/dL    Performed by: Hector Shelton(Float)    POCT Glucose    Collection Time: 04/02/25  7:37 PM   Result Value Ref Range    POC Glucose 166 (H) 65 - 100 mg/dL    Performed by: Anum De La Torre    CBC with Auto Differential    Collection Time: 04/03/25  5:14 AM   Result Value Ref Range    WBC 8.9 4.1 -

## 2025-04-03 NOTE — CARE COORDINATION
Transition of Care Plan:    RUR: 14%  Prior Level of Functioning: assist with ADL's  Disposition: [facility]  MILENA: 4/3  If SNF or IPR: Date FOC offered: 4/2  Date FOC received: 4/2  Accepting facility: [facility]   Date authorization started with reference number: n/a  Date authorization received and expires: n/a  Follow up appointments: post IRF, PCP and Cards  DME needed: at facility  Transportation at discharge: medical transport  IM/IMM Medicare/ letter given: yes  Is patient a [facility] and connected with [facility]?    If yes, was [facility] transfer form completed and [facility] notified?   Caregiver Contact: notified patient's daughter and wife.  Discharge Caregiver contacted prior to discharge? yes  Care Conference needed? no  Barriers to discharge:  none    Patient has been accepted to [facility]. He can discharge there today after 1:30pm. Nursing report can be called to [phone].

## 2025-04-03 NOTE — PLAN OF CARE
PHYSICAL THERAPY TREATMENT     Patient: Micha Thayer (89 y.o. male)  Date: 4/3/2025  Diagnosis: Stroke determined by clinical assessment ([facility]) [I63.9]  Cerebrovascular accident (CVA), unspecified mechanism ([facility]) [I63.9] Stroke determined by clinical assessment ([facility])      Precautions: General Precautions, Fall Risk, Modified Diet (Dysphagia Diet)                      Recommendations for nursing mobility: Encourage HEP in prep for ADLs/mobility; see handout for details, Frequent repositioning to prevent skin breakdown, Use of bed/chair alarm for safety, and Assist x1    In place during session: External Catheter and EKG/telemetry   Chart, physical therapy assessment, plan of care and goals were reviewed.  ASSESSMENT  Patient continues with skilled PT services and is progressing towards goals. Pt supine in bed upon PT arrival, agreeable to session. (See below for objective details and assist levels).     Overall pt tolerated session fair today. Pt transferred to eob with cga/min A. Pt sat eob and demo'd good sitting balance. Attempted to have pt perform seated LE therex, but pt demo'ing difficulty following commands and would perform a few reps and stop. Pt stood 3x from bed min A and demo'd fair/poor standing balance requiring vcs and tcs for sequencing to take side steps ~3ft. Pt returned sitting eob and took a seated rest break before returning supine in bed with all needs met. Will continue to benefit from skilled PT services, and will continue to progress as tolerated. Current PT DC recommendation High intensity and comprehensive skilled physical therapy in a multidisciplinary setting as patient is working towards tolerating up to 3 hours of therapy/day x 5-7 days/week once medically appropriate.    GOALS:  Problem: Physical Therapy - Adult  Goal: By Discharge: Performs mobility at highest level of function for planned discharge setting.  See evaluation for individualized goals.  Description: FUNCTIONAL 
  Problem: Chronic Conditions and Co-morbidities  Goal: Patient's chronic conditions and co-morbidity symptoms are monitored and maintained or improved  Outcome: Progressing     Problem: Discharge Planning  Goal: Discharge to home or other facility with appropriate resources  Outcome: Progressing     Problem: Safety - Adult  Goal: Free from fall injury  4/2/2025 1128 by Cat Young RN  Outcome: Progressing  4/2/2025 0057 by Eboni Wagner RN  Outcome: Progressing     Problem: Skin/Tissue Integrity  Goal: Skin integrity remains intact  Description: 1.  Monitor for areas of redness and/or skin breakdown  2.  Assess vascular access sites hourly  3.  Every 4-6 hours minimum:  Change oxygen saturation probe site  4.  Every 4-6 hours:  If on nasal continuous positive airway pressure, respiratory therapy assess nares and determine need for appliance change or resting period  4/2/2025 1128 by Cat Young RN  Outcome: Progressing  4/2/2025 0057 by Eboni Wagner RN  Outcome: Progressing     Problem: ABCDS Injury Assessment  Goal: Absence of physical injury  Outcome: Progressing     
  Problem: Chronic Conditions and Co-morbidities  Goal: Patient's chronic conditions and co-morbidity symptoms are monitored and maintained or improved  Outcome: Progressing  Flowsheets (Taken 4/3/2025 1007)  Care Plan - Patient's Chronic Conditions and Co-Morbidity Symptoms are Monitored and Maintained or Improved: Monitor and assess patient's chronic conditions and comorbid symptoms for stability, deterioration, or improvement     Problem: Discharge Planning  Goal: Discharge to home or other facility with appropriate resources  Outcome: Progressing  Flowsheets  Taken 4/3/2025 1007 by Maggie Gomez RN  Discharge to home or other facility with appropriate resources: Identify barriers to discharge with patient and caregiver  Taken 4/2/2025 2256 by Trina Osborn RN  Discharge to home or other facility with appropriate resources:   Identify barriers to discharge with patient and caregiver   Arrange for needed discharge resources and transportation as appropriate     Problem: Safety - Adult  Goal: Free from fall injury  Outcome: Progressing     Problem: Skin/Tissue Integrity  Goal: Skin integrity remains intact  Description: 1.  Monitor for areas of redness and/or skin breakdown  2.  Assess vascular access sites hourly  3.  Every 4-6 hours minimum:  Change oxygen saturation probe site  4.  Every 4-6 hours:  If on nasal continuous positive airway pressure, respiratory therapy assess nares and determine need for appliance change or resting period  Outcome: Progressing  Flowsheets  Taken 4/3/2025 1007 by Maggie Gomez RN  Skin Integrity Remains Intact: Monitor for areas of redness and/or skin breakdown  Taken 4/2/2025 2256 by Trina Osborn RN  Skin Integrity Remains Intact: Monitor for areas of redness and/or skin breakdown     Problem: ABCDS Injury Assessment  Goal: Absence of physical injury  Outcome: Progressing  Flowsheets (Taken 4/3/2025 1010)  Absence of Physical Injury: Implement safety measures based on 
  Problem: Discharge Planning  Goal: Discharge to home or other facility with appropriate resources  Recent Flowsheet Documentation  Taken 4/2/2025 2256 by Trina Osborn RN  Discharge to home or other facility with appropriate resources:   Identify barriers to discharge with patient and caregiver   Arrange for needed discharge resources and transportation as appropriate     Problem: Skin/Tissue Integrity  Goal: Skin integrity remains intact  Description: 1.  Monitor for areas of redness and/or skin breakdown  2.  Assess vascular access sites hourly  3.  Every 4-6 hours minimum:  Change oxygen saturation probe site  4.  Every 4-6 hours:  If on nasal continuous positive airway pressure, respiratory therapy assess nares and determine need for appliance change or resting period  Recent Flowsheet Documentation  Taken 4/2/2025 2256 by Trina Osborn RN  Skin Integrity Remains Intact: Monitor for areas of redness and/or skin breakdown       
Speech LAnguage Pathology Dysphagia EVALUATION    Patient: Micha Thayer (89 y.o. male)  Date: 4/2/2025  Primary Diagnosis: Stroke determined by clinical assessment ([facility]) [I63.9]  Cerebrovascular accident (CVA), unspecified mechanism ([facility]) [I63.9]       Precautions: Aspiration, Fall Risk                  Time In: 1150  Time Out: 1211    DIET RECOMMENDATIONS: Minced and moist and mildly thick liquids    SWALLOW SAFETY PRECAUTIONS: Small bites/sips. Slow rate of intake. Alternate bites/sips.  6 small snacks/meals. Upright 1h after PO. Do not eat 3h before bedtime.. 1:1 SUPERVISION. Check for ORAL POCKETING.      ASSESSMENT :    Oropharyngeal dysphagia, mild-moderate  Confusion    Consult received to evaluate oropharyngeal swallowing abilities as part of stroke work-up per chart review.  89 y/o male admitted w/ c/o right LE weakness, right facial droop, slurred speech, dx include stroke, AMS, bradycardia, hypocalcemia, DM, UTI, HTN per chart review. PMH notable for prostate cancer.   Neurology note today assessment  \"Stroke: This accounts for his right-sided symptoms and dysarthria.  His risk factors include diabetes, hypertension and advanced age.  Altered mental status: This certainly would be impacted by stroke as well as mild UTI and hypocalcemia, but elements of his examination (e.g. echolalia) suggest possible underlying dementia.  Dementia will not be diagnosed in the hospital, but this possibility (and simply his advanced age for that matter) will impact his ability to recover from the stroke.\"     MRI brain today report   \"IMPRESSION:   1. Small acute infarct in the left globus pallidus/posterior limb internal  capsule.  2. Generalized parenchymal volume loss and mild to moderate chronic  microvascular ischemic disease.\"     No previous encounter w/ SLP per chart.       Awake/alert. Upright in chair. Pleasant. Confused.   Informal speech / language: Reduced command following. Does not answer most questions. 
EOB unsupported to doff and don socks)     Toileting: Contact guard assistance;Minimal assistance (For good hygiene, min assistance needed as he stood using RW for support.)        Therapeutic Intervention provided:   LE dressing and functional mobility/transfers in preparation for oob activities in the bathroom such as toileting and grooming.    Functional Measure:    [facility] AM-PACTM \"6 Clicks\"                                                       Daily Activity Inpatient Short Form  How much help from another person does the patient currently need... Total; A Lot A Little None   1.  Putting on and taking off regular lower body clothing? []  1 []  2 [x]  3 []  4   2.  Bathing (including washing, rinsing, drying)? []  1 [x]  2 []  3 []  4   3.  Toileting, which includes using toilet, bedpan or urinal? [] 1 []  2 [x]  3 []  4   4.  Putting on and taking off regular upper body clothing? []  1 [x]  2 []  3 []  4   5.  Taking care of personal grooming such as brushing teeth? []  1 []  2 [x]  3 []  4   6.  Eating meals? []  1 []  2 [x]  3 []  4   © 2007, Trustees of [facility], under license to [employer]. All rights reserved     Score: 16/24     Interpretation of Tool:  Represents clinically-significant functional categories (i.e. Activities of daily living).  Percentage of Impairment CH    0%   CI    1-19% CJ    20-39% CK    40-59% CL    60-79% CM    80-99% CN     100%   AMPA  Score 6-24 24 23 20-22 15-19 10-14 7-9 6     Occupational Therapy Evaluation Charge Determination   History Examination Decision-Making   MEDIUM Complexity : Expanded review of history including physical, cognitive and psychocial history  MEDIUM Complexity: 3-5 Performance deficits relating to physical, cognitive, or psychosocial skills that result in activity limitations and/or participation restrictions MEDIUM Complexity: Patient may present with comorbidities that affect occupational performance. Minimal to moderate

## 2025-04-03 NOTE — DISCHARGE SUMMARY
Physician Discharge Summary     Patient ID:    Micha Thayer  [id number]  89 y.o.  9/15/1934    Admit date: 4/1/2025    Discharge date : 4/3/2025      Final Diagnoses:   Stroke determined by clinical assessment ([facility]) [I63.9]  Cerebrovascular accident (CVA), unspecified mechanism ([facility]) [I63.9]  Bradycardia  Atrial flutter  Hypocalcemia resolved  DM  HTN  hypothyroidism    Reason for Hospitalization: Acute CVA    Hospital Course:     acute infarct in the left globus pallidus/posterior limb internal capsule   Presented with slurred speech, right facial droop, right lower extremity weakness, likely 2/2  Confusion/encephalopathy, possible underlying dementia   Aspirin initially given, but discontinued as patient will be on Eliquis 2.5 twice daily  Continue Lipitor 80 mg  Will need to check LFTs outpatient as well as discharge on the atorvastatin  Discharge to rehab  Needs outpatient neurology follow-up     Bradycardia  Atrial flutter  -[id number]  Discussed with cardiology, recommended putting patient on reduced dose of Eliquis 2.5 mg twice daily  Follow-up with Dr. Mccormick outpatient, discussed with patient's family     Hypocalcemia resolved     Diabetes mellitus  -Hold metformin     Hypertension  Restart blood pressure medications on discharge     Bilateral knee osteoarthritis  -Continue pain meds     Chronic subclavian vein occlusion  Hx of Thromobocytosis?  -Continue hydroxyurea, patient family is not sure why he is taking hydroxyurea.  I advised patient's family to ask primary care doctor whether or not patient supposed to continue taking that.     Hypothyroidism  -Continue Synthroid    History of prostate cancer    Discharge Medications:      Medication List        START taking these medications      apixaban 2.5 MG Tabs tablet  Commonly known as: Eliquis  Take 1 tablet by mouth 2 times daily     atorvastatin 80 MG tablet  Commonly known as: LIPITOR  Take 1 tablet by mouth nightly

## 2025-04-11 LAB — PTH RELATED PROT SERPL-SCNC: <2 PMOL/L

## 2025-04-22 ENCOUNTER — APPOINTMENT (OUTPATIENT)
Facility: HOSPITAL | Age: 89
DRG: 309 | End: 2025-04-22
Payer: MEDICARE

## 2025-04-22 ENCOUNTER — HOSPITAL ENCOUNTER (INPATIENT)
Facility: HOSPITAL | Age: 89
LOS: 3 days | Discharge: HOSPICE | DRG: 309 | End: 2025-04-25
Attending: EMERGENCY MEDICINE | Admitting: FAMILY MEDICINE
Payer: MEDICARE

## 2025-04-22 DIAGNOSIS — I48.92 ATRIAL FLUTTER, UNSPECIFIED TYPE (HCC): ICD-10-CM

## 2025-04-22 DIAGNOSIS — R00.1 BRADYCARDIA: ICD-10-CM

## 2025-04-22 DIAGNOSIS — R29.898 MUSCULAR DECONDITIONING: Primary | ICD-10-CM

## 2025-04-22 DIAGNOSIS — R53.1 GENERAL WEAKNESS: ICD-10-CM

## 2025-04-22 LAB
ALBUMIN SERPL-MCNC: 2.8 G/DL (ref 3.5–5)
ALBUMIN SERPL-MCNC: 3.2 G/DL (ref 3.5–5)
ALBUMIN SERPL-MCNC: 3.2 G/DL (ref 3.5–5)
ALBUMIN/GLOB SERPL: 0.7 (ref 1.1–2.2)
ALBUMIN/GLOB SERPL: 0.8 (ref 1.1–2.2)
ALBUMIN/GLOB SERPL: 0.8 (ref 1.1–2.2)
ALP SERPL-CCNC: 74 U/L (ref 45–117)
ALP SERPL-CCNC: 86 U/L (ref 45–117)
ALP SERPL-CCNC: 86 U/L (ref 45–117)
ALT SERPL-CCNC: 36 U/L (ref 12–78)
ALT SERPL-CCNC: 37 U/L (ref 12–78)
ALT SERPL-CCNC: ABNORMAL U/L (ref 12–78)
ANION GAP SERPL CALC-SCNC: 1 MMOL/L (ref 2–12)
ANION GAP SERPL CALC-SCNC: 2 MMOL/L (ref 2–12)
ANION GAP SERPL CALC-SCNC: 3 MMOL/L (ref 2–12)
AST SERPL W P-5'-P-CCNC: 21 U/L (ref 15–37)
AST SERPL W P-5'-P-CCNC: 24 U/L (ref 15–37)
AST SERPL W P-5'-P-CCNC: ABNORMAL U/L (ref 15–37)
BASOPHILS # BLD: 0.06 K/UL (ref 0–0.1)
BASOPHILS NFR BLD: 0.6 % (ref 0–1)
BILIRUB SERPL-MCNC: 0.6 MG/DL (ref 0.2–1)
BUN SERPL-MCNC: 18 MG/DL (ref 6–20)
BUN SERPL-MCNC: 20 MG/DL (ref 6–20)
BUN SERPL-MCNC: 21 MG/DL (ref 6–20)
BUN/CREAT SERPL: 22 (ref 12–20)
BUN/CREAT SERPL: 23 (ref 12–20)
BUN/CREAT SERPL: 24 (ref 12–20)
CA-I BLD-MCNC: 8.6 MG/DL (ref 8.5–10.1)
CA-I BLD-MCNC: 8.8 MG/DL (ref 8.5–10.1)
CA-I BLD-MCNC: 9.2 MG/DL (ref 8.5–10.1)
CHLORIDE SERPL-SCNC: 108 MMOL/L (ref 97–108)
CHLORIDE SERPL-SCNC: 110 MMOL/L (ref 97–108)
CHLORIDE SERPL-SCNC: 112 MMOL/L (ref 97–108)
CO2 SERPL-SCNC: 30 MMOL/L (ref 21–32)
CO2 SERPL-SCNC: 30 MMOL/L (ref 21–32)
CO2 SERPL-SCNC: 31 MMOL/L (ref 21–32)
CREAT SERPL-MCNC: 0.75 MG/DL (ref 0.7–1.3)
CREAT SERPL-MCNC: 0.88 MG/DL (ref 0.7–1.3)
CREAT SERPL-MCNC: 0.97 MG/DL (ref 0.7–1.3)
DIFFERENTIAL METHOD BLD: ABNORMAL
EOSINOPHIL # BLD: 0.08 K/UL (ref 0–0.4)
EOSINOPHIL NFR BLD: 0.9 % (ref 0–7)
ERYTHROCYTE [DISTWIDTH] IN BLOOD BY AUTOMATED COUNT: 14.5 % (ref 11.5–14.5)
GLOBULIN SER CALC-MCNC: 3.7 G/DL (ref 2–4)
GLOBULIN SER CALC-MCNC: 4.2 G/DL (ref 2–4)
GLOBULIN SER CALC-MCNC: 4.3 G/DL (ref 2–4)
GLUCOSE BLD STRIP.AUTO-MCNC: 84 MG/DL (ref 65–100)
GLUCOSE BLD-MCNC: 84 MG/DL
GLUCOSE SERPL-MCNC: 159 MG/DL (ref 65–100)
GLUCOSE SERPL-MCNC: 178 MG/DL (ref 65–100)
GLUCOSE SERPL-MCNC: 89 MG/DL (ref 65–100)
HCT VFR BLD AUTO: 38.2 % (ref 36.6–50.3)
HGB BLD-MCNC: 12.6 G/DL (ref 12.1–17)
IMM GRANULOCYTES # BLD AUTO: 0.05 K/UL (ref 0–0.04)
IMM GRANULOCYTES NFR BLD AUTO: 0.5 % (ref 0–0.5)
LYMPHOCYTES # BLD: 1.35 K/UL (ref 0.8–3.5)
LYMPHOCYTES NFR BLD: 14.5 % (ref 12–49)
MAGNESIUM SERPL-MCNC: 2.1 MG/DL (ref 1.6–2.4)
MAGNESIUM SERPL-MCNC: NORMAL MG/DL (ref 1.6–2.4)
MCH RBC QN AUTO: 35.8 PG (ref 26–34)
MCHC RBC AUTO-ENTMCNC: 33 G/DL (ref 30–36.5)
MCV RBC AUTO: 108.5 FL (ref 80–99)
MONOCYTES # BLD: 0.64 K/UL (ref 0–1)
MONOCYTES NFR BLD: 6.9 % (ref 5–13)
NEUTS SEG # BLD: 7.12 K/UL (ref 1.8–8)
NEUTS SEG NFR BLD: 76.6 % (ref 32–75)
NRBC # BLD: 0 K/UL (ref 0–0.01)
NRBC BLD-RTO: 0 PER 100 WBC
PERFORMED BY:: NORMAL
PLATELET # BLD AUTO: 168 K/UL (ref 150–400)
PMV BLD AUTO: 10 FL (ref 8.9–12.9)
POTASSIUM SERPL-SCNC: 3.6 MMOL/L (ref 3.5–5.1)
POTASSIUM SERPL-SCNC: 3.8 MMOL/L (ref 3.5–5.1)
POTASSIUM SERPL-SCNC: ABNORMAL MMOL/L (ref 3.5–5.1)
PROT SERPL-MCNC: 6.5 G/DL (ref 6.4–8.2)
PROT SERPL-MCNC: 7.4 G/DL (ref 6.4–8.2)
PROT SERPL-MCNC: 7.5 G/DL (ref 6.4–8.2)
RBC # BLD AUTO: 3.52 M/UL (ref 4.1–5.7)
RBC MORPH BLD: ABNORMAL
SODIUM SERPL-SCNC: 139 MMOL/L (ref 136–145)
SODIUM SERPL-SCNC: 143 MMOL/L (ref 136–145)
SODIUM SERPL-SCNC: 145 MMOL/L (ref 136–145)
TROPONIN I SERPL HS-MCNC: 16 NG/L (ref 0–76)
WBC # BLD AUTO: 9.3 K/UL (ref 4.1–11.1)

## 2025-04-22 PROCEDURE — 2580000003 HC RX 258: Performed by: EMERGENCY MEDICINE

## 2025-04-22 PROCEDURE — 70450 CT HEAD/BRAIN W/O DYE: CPT

## 2025-04-22 PROCEDURE — 36415 COLL VENOUS BLD VENIPUNCTURE: CPT

## 2025-04-22 PROCEDURE — 84484 ASSAY OF TROPONIN QUANT: CPT

## 2025-04-22 PROCEDURE — 82962 GLUCOSE BLOOD TEST: CPT

## 2025-04-22 PROCEDURE — 93005 ELECTROCARDIOGRAM TRACING: CPT | Performed by: EMERGENCY MEDICINE

## 2025-04-22 PROCEDURE — 80053 COMPREHEN METABOLIC PANEL: CPT

## 2025-04-22 PROCEDURE — 2060000000 HC ICU INTERMEDIATE R&B

## 2025-04-22 PROCEDURE — 2500000003 HC RX 250 WO HCPCS: Performed by: FAMILY MEDICINE

## 2025-04-22 PROCEDURE — 99285 EMERGENCY DEPT VISIT HI MDM: CPT

## 2025-04-22 PROCEDURE — 83735 ASSAY OF MAGNESIUM: CPT

## 2025-04-22 PROCEDURE — 83036 HEMOGLOBIN GLYCOSYLATED A1C: CPT

## 2025-04-22 PROCEDURE — 71045 X-RAY EXAM CHEST 1 VIEW: CPT

## 2025-04-22 PROCEDURE — 94761 N-INVAS EAR/PLS OXIMETRY MLT: CPT

## 2025-04-22 PROCEDURE — 6370000000 HC RX 637 (ALT 250 FOR IP): Performed by: FAMILY MEDICINE

## 2025-04-22 PROCEDURE — 85025 COMPLETE CBC W/AUTO DIFF WBC: CPT

## 2025-04-22 RX ORDER — ACETAMINOPHEN 325 MG/1
650 TABLET ORAL EVERY 6 HOURS PRN
Status: DISCONTINUED | OUTPATIENT
Start: 2025-04-22 | End: 2025-04-25 | Stop reason: HOSPADM

## 2025-04-22 RX ORDER — GLUCAGON 1 MG/ML
1 KIT INJECTION PRN
Status: DISCONTINUED | OUTPATIENT
Start: 2025-04-22 | End: 2025-04-25 | Stop reason: HOSPADM

## 2025-04-22 RX ORDER — DEXTROSE MONOHYDRATE 100 MG/ML
INJECTION, SOLUTION INTRAVENOUS CONTINUOUS PRN
Status: DISCONTINUED | OUTPATIENT
Start: 2025-04-22 | End: 2025-04-25 | Stop reason: HOSPADM

## 2025-04-22 RX ORDER — SODIUM CHLORIDE 0.9 % (FLUSH) 0.9 %
5-40 SYRINGE (ML) INJECTION EVERY 12 HOURS SCHEDULED
Status: DISCONTINUED | OUTPATIENT
Start: 2025-04-22 | End: 2025-04-25 | Stop reason: HOSPADM

## 2025-04-22 RX ORDER — LEVOTHYROXINE SODIUM 75 UG/1
150 TABLET ORAL DAILY
Status: DISCONTINUED | OUTPATIENT
Start: 2025-04-23 | End: 2025-04-25 | Stop reason: HOSPADM

## 2025-04-22 RX ORDER — ONDANSETRON 2 MG/ML
4 INJECTION INTRAMUSCULAR; INTRAVENOUS EVERY 6 HOURS PRN
Status: DISCONTINUED | OUTPATIENT
Start: 2025-04-22 | End: 2025-04-25 | Stop reason: HOSPADM

## 2025-04-22 RX ORDER — POTASSIUM CHLORIDE 7.45 MG/ML
10 INJECTION INTRAVENOUS PRN
Status: DISCONTINUED | OUTPATIENT
Start: 2025-04-22 | End: 2025-04-25 | Stop reason: HOSPADM

## 2025-04-22 RX ORDER — 0.9 % SODIUM CHLORIDE 0.9 %
500 INTRAVENOUS SOLUTION INTRAVENOUS
Status: COMPLETED | OUTPATIENT
Start: 2025-04-22 | End: 2025-04-22

## 2025-04-22 RX ORDER — LANOLIN ALCOHOL/MO/W.PET/CERES
1000 CREAM (GRAM) TOPICAL DAILY
Status: DISCONTINUED | OUTPATIENT
Start: 2025-04-23 | End: 2025-04-25 | Stop reason: HOSPADM

## 2025-04-22 RX ORDER — MAGNESIUM SULFATE IN WATER 40 MG/ML
2000 INJECTION, SOLUTION INTRAVENOUS PRN
Status: DISCONTINUED | OUTPATIENT
Start: 2025-04-22 | End: 2025-04-25 | Stop reason: HOSPADM

## 2025-04-22 RX ORDER — INSULIN LISPRO 100 [IU]/ML
0-16 INJECTION, SOLUTION INTRAVENOUS; SUBCUTANEOUS
Status: DISCONTINUED | OUTPATIENT
Start: 2025-04-22 | End: 2025-04-25 | Stop reason: HOSPADM

## 2025-04-22 RX ORDER — HYDROXYUREA 500 MG/1
500 CAPSULE ORAL DAILY
Status: DISCONTINUED | OUTPATIENT
Start: 2025-04-23 | End: 2025-04-25 | Stop reason: HOSPADM

## 2025-04-22 RX ORDER — POLYETHYLENE GLYCOL 3350 17 G/17G
17 POWDER, FOR SOLUTION ORAL DAILY PRN
Status: DISCONTINUED | OUTPATIENT
Start: 2025-04-22 | End: 2025-04-25 | Stop reason: HOSPADM

## 2025-04-22 RX ORDER — SODIUM CHLORIDE 9 MG/ML
INJECTION, SOLUTION INTRAVENOUS PRN
Status: DISCONTINUED | OUTPATIENT
Start: 2025-04-22 | End: 2025-04-25 | Stop reason: HOSPADM

## 2025-04-22 RX ORDER — GUAIFENESIN 200 MG/10ML
200 LIQUID ORAL EVERY 4 HOURS PRN
Status: DISCONTINUED | OUTPATIENT
Start: 2025-04-22 | End: 2025-04-25 | Stop reason: HOSPADM

## 2025-04-22 RX ORDER — ATORVASTATIN CALCIUM 40 MG/1
80 TABLET, FILM COATED ORAL NIGHTLY
Status: DISCONTINUED | OUTPATIENT
Start: 2025-04-22 | End: 2025-04-25 | Stop reason: HOSPADM

## 2025-04-22 RX ORDER — ACETAMINOPHEN 650 MG/1
650 SUPPOSITORY RECTAL EVERY 6 HOURS PRN
Status: DISCONTINUED | OUTPATIENT
Start: 2025-04-22 | End: 2025-04-25 | Stop reason: HOSPADM

## 2025-04-22 RX ORDER — POTASSIUM CHLORIDE 1500 MG/1
40 TABLET, EXTENDED RELEASE ORAL PRN
Status: DISCONTINUED | OUTPATIENT
Start: 2025-04-22 | End: 2025-04-25 | Stop reason: HOSPADM

## 2025-04-22 RX ORDER — ONDANSETRON 4 MG/1
4 TABLET, ORALLY DISINTEGRATING ORAL EVERY 8 HOURS PRN
Status: DISCONTINUED | OUTPATIENT
Start: 2025-04-22 | End: 2025-04-25 | Stop reason: HOSPADM

## 2025-04-22 RX ORDER — SODIUM CHLORIDE 0.9 % (FLUSH) 0.9 %
5-40 SYRINGE (ML) INJECTION PRN
Status: DISCONTINUED | OUTPATIENT
Start: 2025-04-22 | End: 2025-04-25 | Stop reason: HOSPADM

## 2025-04-22 RX ORDER — LOSARTAN POTASSIUM 50 MG/1
50 TABLET ORAL DAILY
Status: DISCONTINUED | OUTPATIENT
Start: 2025-04-23 | End: 2025-04-25 | Stop reason: HOSPADM

## 2025-04-22 RX ADMIN — SODIUM CHLORIDE, PRESERVATIVE FREE 10 ML: 5 INJECTION INTRAVENOUS at 21:12

## 2025-04-22 RX ADMIN — ATORVASTATIN CALCIUM 80 MG: 40 TABLET, FILM COATED ORAL at 21:11

## 2025-04-22 RX ADMIN — APIXABAN 2.5 MG: 2.5 TABLET, FILM COATED ORAL at 21:11

## 2025-04-22 RX ADMIN — SODIUM CHLORIDE 500 ML: 9 INJECTION, SOLUTION INTRAVENOUS at 19:14

## 2025-04-22 RX ADMIN — METFORMIN HYDROCHLORIDE 500 MG: 500 TABLET ORAL at 21:11

## 2025-04-22 ASSESSMENT — PAIN - FUNCTIONAL ASSESSMENT: PAIN_FUNCTIONAL_ASSESSMENT: NONE - DENIES PAIN

## 2025-04-22 NOTE — CARE COORDINATION
4/22/25 CM asked to meet with pt & wife  Berta Thayer) & per wife pt unable to stand/walk. Pt discharged from [facility] last Thursday & wife requesting re-admission. [facility]  IRF liaison ( Faye) informed via phone of referral, PT/OT evals in the am.

## 2025-04-22 NOTE — ED PROVIDER NOTES
[facility] EMERGENCY DEPT  EMERGENCY DEPARTMENT HISTORY AND PHYSICAL EXAM      Date of evaluation: 4/22/2025  Patient Name: Micha Thayer  Birthdate 9/15/1934  MRN: [id number]  ED Provider: Danny Mendes MD   Note Started: 4:27 PM EDT 4/22/25    HISTORY OF PRESENT ILLNESS     Chief Complaint   Patient presents with    Extremity Weakness       History Provided By: Patient, spouse     HPI: Micha Thayer is a 89 y.o. male with a history of CVA, hypocalcemia, bradycardia, presenting for bradycardia and right-sided weakness.  According to the wife, patient was admitted for a right-sided stroke that caused him to have left-sided weakness.  Since he came back from rehab they report that he is all over a week but actually worse on the right side now compared to the left.  This has been going on for the size several days since he has been home.  He is also been having continued low heart rates.  Home health came and visited him today and noted that his blood pressure as well as heart rate was low.  Plan was to follow-up with Dr. Mccormick outpatient as he did have atrial flutter and bradycardia while in the hospital.  Patient denies any pain    PAST MEDICAL HISTORY   Past Medical History:  Past Medical History:   Diagnosis Date    Atrial flutter (HCC)     Bradycardia     CVA (cerebral vascular accident) (HCC)     Hypocalcemia     VTE (venous thromboembolism)        Past Surgical History:  No past surgical history on file.    Family History:  No family history on file.    Social History:       Allergies:  No Known Allergies    PCP: Nelida Velasco DO    Current Meds:   Current Facility-Administered Medications   Medication Dose Route Frequency Provider Last Rate Last Admin    sodium chloride 0.9 % bolus 500 mL  500 mL IntraVENous NOW Danny Mendes MD         Current Outpatient Medications   Medication Sig Dispense Refill    atorvastatin (LIPITOR) 80 MG tablet Take 1 tablet by mouth nightly 30 tablet 3    vitamin B-12 1000 MCG

## 2025-04-22 NOTE — ED TRIAGE NOTES
Client home health nurse reports having weakness in r.  Side of body today since this am, client has recent CVA with l. Side 1 month ago.  Recently home from rehab. NAD. AXOX4.

## 2025-04-23 LAB
BASOPHILS # BLD: 0.07 K/UL (ref 0–0.1)
BASOPHILS NFR BLD: 0.8 % (ref 0–1)
DIFFERENTIAL METHOD BLD: ABNORMAL
EKG ATRIAL RATE: 271 BPM
EKG DIAGNOSIS: NORMAL
EKG Q-T INTERVAL: 488 MS
EKG QRS DURATION: 156 MS
EKG QTC CALCULATION (BAZETT): 407 MS
EKG R AXIS: -72 DEGREES
EKG T AXIS: -57 DEGREES
EKG VENTRICULAR RATE: 42 BPM
EOSINOPHIL # BLD: 0.13 K/UL (ref 0–0.4)
EOSINOPHIL NFR BLD: 1.5 % (ref 0–7)
ERYTHROCYTE [DISTWIDTH] IN BLOOD BY AUTOMATED COUNT: 14.5 % (ref 11.5–14.5)
EST. AVERAGE GLUCOSE BLD GHB EST-MCNC: 154 MG/DL
GLUCOSE BLD STRIP.AUTO-MCNC: 106 MG/DL (ref 65–100)
GLUCOSE BLD STRIP.AUTO-MCNC: 107 MG/DL (ref 65–100)
GLUCOSE BLD STRIP.AUTO-MCNC: 75 MG/DL (ref 65–100)
GLUCOSE BLD STRIP.AUTO-MCNC: 79 MG/DL (ref 65–100)
HBA1C MFR BLD: 7 % (ref 4–5.6)
HCT VFR BLD AUTO: 35.1 % (ref 36.6–50.3)
HGB BLD-MCNC: 11.5 G/DL (ref 12.1–17)
IMM GRANULOCYTES # BLD AUTO: 0.04 K/UL (ref 0–0.04)
IMM GRANULOCYTES NFR BLD AUTO: 0.5 % (ref 0–0.5)
LYMPHOCYTES # BLD: 1.44 K/UL (ref 0.8–3.5)
LYMPHOCYTES NFR BLD: 16.4 % (ref 12–49)
MCH RBC QN AUTO: 35.7 PG (ref 26–34)
MCHC RBC AUTO-ENTMCNC: 32.8 G/DL (ref 30–36.5)
MCV RBC AUTO: 109 FL (ref 80–99)
MONOCYTES # BLD: 0.75 K/UL (ref 0–1)
MONOCYTES NFR BLD: 8.5 % (ref 5–13)
NEUTS SEG # BLD: 6.36 K/UL (ref 1.8–8)
NEUTS SEG NFR BLD: 72.3 % (ref 32–75)
NRBC # BLD: 0 K/UL (ref 0–0.01)
NRBC BLD-RTO: 0 PER 100 WBC
PERFORMED BY:: ABNORMAL
PERFORMED BY:: ABNORMAL
PERFORMED BY:: NORMAL
PERFORMED BY:: NORMAL
PLATELET # BLD AUTO: 168 K/UL (ref 150–400)
PMV BLD AUTO: 9.6 FL (ref 8.9–12.9)
RBC # BLD AUTO: 3.22 M/UL (ref 4.1–5.7)
RBC MORPH BLD: ABNORMAL
WBC # BLD AUTO: 8.8 K/UL (ref 4.1–11.1)

## 2025-04-23 PROCEDURE — 2500000003 HC RX 250 WO HCPCS: Performed by: FAMILY MEDICINE

## 2025-04-23 PROCEDURE — 6370000000 HC RX 637 (ALT 250 FOR IP): Performed by: FAMILY MEDICINE

## 2025-04-23 PROCEDURE — 85025 COMPLETE CBC W/AUTO DIFF WBC: CPT

## 2025-04-23 PROCEDURE — 82962 GLUCOSE BLOOD TEST: CPT

## 2025-04-23 PROCEDURE — 97162 PT EVAL MOD COMPLEX 30 MIN: CPT

## 2025-04-23 PROCEDURE — 97116 GAIT TRAINING THERAPY: CPT

## 2025-04-23 PROCEDURE — 92610 EVALUATE SWALLOWING FUNCTION: CPT

## 2025-04-23 PROCEDURE — 2060000000 HC ICU INTERMEDIATE R&B

## 2025-04-23 PROCEDURE — 97530 THERAPEUTIC ACTIVITIES: CPT

## 2025-04-23 PROCEDURE — 97165 OT EVAL LOW COMPLEX 30 MIN: CPT

## 2025-04-23 PROCEDURE — 36415 COLL VENOUS BLD VENIPUNCTURE: CPT

## 2025-04-23 RX ORDER — M-VIT,TX,IRON,MINS/CALC/FOLIC 27MG-0.4MG
1 TABLET ORAL DAILY
Status: ON HOLD | COMMUNITY
End: 2025-04-24 | Stop reason: HOSPADM

## 2025-04-23 RX ORDER — POLYETHYLENE GLYCOL 3350 17 G/17G
17 POWDER, FOR SOLUTION ORAL DAILY PRN
COMMUNITY

## 2025-04-23 RX ORDER — SENNA AND DOCUSATE SODIUM 50; 8.6 MG/1; MG/1
1 TABLET, FILM COATED ORAL 2 TIMES DAILY
COMMUNITY

## 2025-04-23 RX ADMIN — SODIUM CHLORIDE, PRESERVATIVE FREE 10 ML: 5 INJECTION INTRAVENOUS at 20:38

## 2025-04-23 RX ADMIN — LOSARTAN POTASSIUM 50 MG: 50 TABLET, FILM COATED ORAL at 08:41

## 2025-04-23 RX ADMIN — CYANOCOBALAMIN TAB 1000 MCG 1000 MCG: 1000 TAB at 08:41

## 2025-04-23 RX ADMIN — APIXABAN 2.5 MG: 2.5 TABLET, FILM COATED ORAL at 20:37

## 2025-04-23 RX ADMIN — HYDROXYUREA 500 MG: 500 CAPSULE ORAL at 08:41

## 2025-04-23 RX ADMIN — LEVOTHYROXINE SODIUM 150 MCG: 0.07 TABLET ORAL at 05:46

## 2025-04-23 RX ADMIN — APIXABAN 2.5 MG: 2.5 TABLET, FILM COATED ORAL at 08:41

## 2025-04-23 RX ADMIN — METFORMIN HYDROCHLORIDE 500 MG: 500 TABLET ORAL at 08:40

## 2025-04-23 RX ADMIN — ATORVASTATIN CALCIUM 80 MG: 40 TABLET, FILM COATED ORAL at 20:37

## 2025-04-23 ASSESSMENT — PAIN SCALES - GENERAL
PAINLEVEL_OUTOF10: 0

## 2025-04-23 NOTE — H&P
Hospitalist Admission Note    NAME: Micha Thayer   :  9/15/1934   MRN:  [id number]     Date/Time:  2025 8:08 PM    Patient PCP: Nelida Velasco,     ______________________________________________________________________  Given the patient's current clinical presentation, I have a high level of concern for decompensation if discharged from the emergency department.  Complex decision making was performed, which includes reviewing the patient's available past medical records, laboratory results, and x-ray films.       My assessment of this patient's clinical condition and my plan of care is as follows.    Assessment / Plan:    Right-sided weakness/rule out stroke  Bradycardia  HTN  DM  recent CVA  History of a flutter with bradycardia  History of hypocalcemia  Hypothyroid  sickle cell disease  Hypercholesterolemia  history of prostate cancer/status post prostatectomy    Admit patient to inpatient telemetry unit  Consult cardiology  Obtain labs: CBC, CMP    Continue home meds:  Eliquis 2.5 mg twice daily  Tylenol 500 mg as needed  Hydroxyurea 500 mg daily  Atorvastatin 80 mg daily  Metformin 500 mg twice daily  Olmesartan 20 mg daily  B12 1000 MCG daily  Levothyroxine 150 MCG daily          Medical Decision Making:   I personally reviewed labs:   I personally reviewed imaging:  Toxic drug monitoring:     Discussed case with: ED provider. After discussion I am in agreement that acuity of patient's medical condition necessitates hospital stay.      Code Status: DNR/DNI  DVT Prophylaxis: Patient on Eliquis  GI Prophylaxis: None      Subjective:   CHIEF COMPLAINT: Right-sided weakness and bradycardia    HISTORY OF PRESENT ILLNESS:     Micha Thayer is a 89 y.o.  male with PMHx significant for HTN ,DM, recent CVA, History of a flutter with bradycardia, History of hypocalcemia, Hypothyroid, sickle cell disease, Hypercholesterolemia, history of prostate cancer/status post prostatectomy  Patient presents to the ER  today for bradycardia and right-sided weakness.  According to family member present in the room, patient had a confirmed stroke about 3 weeks ago.  Patient was discharged on 4/1/2025 for an acute CVA and bradycardia/A-flutter.  Patient was placed on Eliquis 2.5 mg twice daily due to [id number] score of 5. patient had an acute infarct in the left globus pallidus/posterior limb internal capsule. Patient was discharged to [facility] for rehab until this Thursday, patient was able to ambulate well and on own.      However today patient's daughter noticed patient leaning to left and the home nurse noticed heart rate was in 50s this afternoon.  Patient denies chest pain, abdominal pain, nausea vomiting, fevers or chills.  Does admit some shortness of breath.  Patient is a former smoker but denies any alcohol or drug use.    Upon ED course, patient's glucose was elevated at 159.  Negative troponin.  In the ER, patient's heart rates are in the 40s.  Normal magnesium.  Chest x-ray shows no acute processes.  CT head shows no acute intracranial processes.        We were asked to admit for work up and evaluation of the above problems.     Past Medical History:   Diagnosis Date    Atrial flutter (HCC)     Bradycardia     CVA (cerebral vascular accident) (HCC)     Hypocalcemia     VTE (venous thromboembolism)         No past surgical history on file.    Social History     Tobacco Use    Smoking status: Not on file    Smokeless tobacco: Not on file   Substance Use Topics    Alcohol use: Not on file        No family history on file.  No Known Allergies     Prior to Admission medications    Medication Sig Start Date End Date Taking? Authorizing Provider   atorvastatin (LIPITOR) 80 MG tablet Take 1 tablet by mouth nightly 4/3/25   John Zurita MD   vitamin B-12 1000 MCG tablet Take 1 tablet by mouth daily 4/6/25   John Zurita MD   apixaban (ELIQUIS) 2.5 MG TABS tablet Take 1 tablet by mouth 2 times daily 4/3/25   Parminder

## 2025-04-23 NOTE — PLAN OF CARE
PHYSICAL THERAPY EVALUATION  Patient: Micha Thayer (89 y.o. male)  Date: 4/23/2025  Primary Diagnosis: Bradycardia [R00.1]  General weakness [R53.1]  Muscular deconditioning [R29.898]  Atrial flutter, unspecified type (HCC) [I48.92]       Precautions: General Precautions, Fall Risk                      Recommendations for nursing mobility: Frequent repositioning to prevent skin breakdown, Use of bed/chair alarm for safety, Use of BSC for toileting , AD and gt belt for bed to chair , and Assist x2    In place during session: External Catheter and EKG/telemetry     ASSESSMENT  Pt is a 89 y.o. male admitted on 4/22/2025 for bradycardia and R sided weakness; pt currently being treated for symptomatic bradycardia 2/2 slow atrial flutter, paroxysmal atrial flutter, hyperlipidemia, Recent L sided stroke, h/o prostate CA, DM 2, essential HTN, hypothyroidism . Pt lying in semisupine position upon PT arrival, agreeable to evaluation. Pt A&O x 1.  Patient not oriented to birthday, time, place or situation. Patient's daughter present for over 1/2 of evaluation.    Based on the objective data described below, the patient currently presents with impaired functional mobility, impaired strength, decreased activity tolerance, poor safety awareness, impaired cognition, and impaired balance. (See below for objective details and assist levels).     Overall pt tolerated session fair today with PT evaluation. Pt required cga/min assist for supine>sit bed mobility. Patient scoot to eob with sba. Patient sat at eob with good static sit balance. Patient required min assist x 1 for dynamic sit balance. Perform MMT while patient sitting at eob. Patient sitting in midline. Noted no facial droop however daughter stated that at home he was having a L facial droop. Patient transfer sit>stand with mod assist x 1. Pt amb 2 steps to R side with rw, gt belt and mod assist x1 and min assist x 1. ; demonstrates slow shine. Noted that patient

## 2025-04-23 NOTE — CARE COORDINATION
04/23/25 1438   Service Assessment   Patient Orientation Unable to Assess   Cognition Alert   History Provided By Child/Family;Spouse   Primary Caregiver Family   Support Systems Spouse/Significant Other;Children   Patient's Healthcare Decision Maker is: Legal Next of Kin   PCP Verified by CM Yes  (Dr. Nelida Velasco)   Last Visit to PCP Within last 3 months   Prior Functional Level Assistance with the following:;Mobility;Bathing;Dressing;Toileting;Feeding   Current Functional Level Mobility;Assistance with the following:;Bathing;Dressing;Toileting;Feeding   Can patient return to prior living arrangement Yes   Ability to make needs known: Good   Family able to assist with home care needs: Yes   Would you like for me to discuss the discharge plan with any other family members/significant others, and if so, who? Yes   Financial Resources Medicare   Social/Functional History   Lives With Spouse   Type of Home House   Home Layout One level   Home Access Stairs to enter with rails   Entrance Stairs - Number of Steps 2   Home Equipment None   Receives Help From Family   Prior Level of Assist for ADLs Needs assistance   Toileting Needs assistance   Prior Level of Assist for Homemaking Needs assistance   Ambulation Assistance Needs assistance   Prior Level of Assist for Transfers Needs assistance   Discharge Planning   Type of Residence House   Living Arrangements Spouse/Significant Other   Current Services Prior To Admission None   Potential Assistance Needed N/A   Potential Assistance Purchasing Medications No   Type of Home Care Services None   Patient expects to be discharged to: House   One/Two Story Residence One story   History of falls? 0   Services At/After Discharge   Confirm Follow Up Transport Family     Patient and wife live in a rancher with 2 steps to the entrance. Patient has a wheelchair, walker and cane. He is current with [facility], No SNF and recent at [facility].     Patient gets meds at [facility] [address]

## 2025-04-23 NOTE — PLAN OF CARE
Problem: Chronic Conditions and Co-morbidities  Goal: Patient's chronic conditions and co-morbidity symptoms are monitored and maintained or improved  4/23/2025 0725 by Sergio Negro RN  Outcome: Progressing  4/23/2025 0226 by Patrick Donovan RN  Outcome: Progressing     Problem: Discharge Planning  Goal: Discharge to home or other facility with appropriate resources  4/23/2025 0725 by Sergio Negro RN  Outcome: Progressing  4/23/2025 0226 by Patrick Donovan RN  Outcome: Progressing     Problem: Skin/Tissue Integrity  Goal: Skin integrity remains intact  Description: 1.  Monitor for areas of redness and/or skin breakdown2.  Assess vascular access sites hourly3.  Every 4-6 hours minimum:  Change oxygen saturation probe site4.  Every 4-6 hours:  If on nasal continuous positive airway pressure, respiratory therapy assess nares and determine need for appliance change or resting period  4/23/2025 0725 by Sergio Negro RN  Outcome: Progressing  4/23/2025 0226 by Patrick Donovan RN  Outcome: Progressing  Flowsheets (Taken 4/22/2025 2300)  Skin Integrity Remains Intact: Monitor for areas of redness and/or skin breakdown     Problem: Safety - Adult  Goal: Free from fall injury  4/23/2025 0725 by Sergio Negro RN  Outcome: Progressing  4/23/2025 0226 by Patrick Donovan RN  Outcome: Progressing     Problem: ABCDS Injury Assessment  Goal: Absence of physical injury  4/23/2025 0725 by Sergio Negro RN  Outcome: Progressing  4/23/2025 0226 by Patrick Donovan RN  Outcome: Progressing

## 2025-04-23 NOTE — PROGRESS NOTES
Received Order for Telemetry     Micha Thayer   9/15/1934   [id number]   Bradycardia [R00.1]   Leslye Flores MD     Tele Box # [id number] placed on patient at  2239 pm  ER Room # 19  Admitting to Room 463  Transferring Nurse Martita  Verified with Primary Nurse Patrick at  2245 pm

## 2025-04-23 NOTE — PROGRESS NOTES
CM spoke to wife and daughter after they spoke to primary about hospice. Choice letter completed for [facility]. Referral sent.     Patient accepted with [facility].

## 2025-04-23 NOTE — THERAPY EVALUATION
OCCUPATIONAL THERAPY EVALUATION  Patient: Micha Thayer (89 y.o. male)  Date: 4/23/2025  Primary Diagnosis: Bradycardia [R00.1]  General weakness [R53.1]  Muscular deconditioning [R29.898]  Atrial flutter, unspecified type (HCC) [I48.92]       Precautions: Fall Risk, Bed Alarm                Recommendations for nursing mobility: Out of bed to chair for meals, Encourage HEP in prep for ADLs/mobility; see handout for details, Frequent repositioning to prevent skin breakdown, Use of bed/chair alarm for safety, LE elevation for management of edema, Use of BSC for toileting , AD and gt belt for bed to chair , and Assist x1    In place during session:External Catheter and EKG/telemetry   ASSESSMENT  Pt is a 89 y.o. male presenting to [facility] with c/o R-sided weakness and bradycardia, admitted 4/22/25 and currently being treated for generalized weakness, unsteady gait, bradycardia, recent CVA. Head CT 4/22 shows no acute intracranial process. Pt received ambulating with PT upon arrival, AXO x 1, and agreeable to OT evaluation.     Based on current observations, pt presents with decreased  functional mobility, independence in ADLs, high-level IADLs, ROM, strength, body mechanics, activity tolerance, safety awareness, cognition, command following, attention/concentration, coordination, balance, posture (see below for objective details and assist levels).     Overall, pt tolerates session fair with no c/o pain. OOB functional mobility completed with min A and RW. LB dressing completed with mod A, see details below. Pt scooted toward HOB with CGA and then returned supine with mod A. Grooming completed semi supine with SBA. VC req'd throughout for initiation and attending to tasks. Additional time req'd for all activity. HR remained in the 60's throughout session. Pt will benefit from continued skilled OT services to address current impairments and improve IND and safety with self cares and functional transfers/mobility. Current  this patient’s progress and plan of care.    This patient’s plan of care is appropriate for delegation to KINA.     PT/OT sessions partially overlapped for increased safety of pt and clinician.     Thank you for this referral,  Lisy Milan OT  Minutes: 25

## 2025-04-23 NOTE — H&P
Hospitalist Admission Note    NAME: Micha Thayer   :  9/15/1934   MRN:  [id number]     Date/Time:  2025 9:08 PM    Patient PCP: Nelida Velasco, DO    ______________________________________________________________________  Given the patient's current clinical presentation, I have a high level of concern for decompensation if discharged from the emergency department.  Complex decision making was performed, which includes reviewing the patient's available past medical records, laboratory results, and x-ray films.       My assessment of this patient's clinical condition and my plan of care is as follows.    Assessment / Plan:    Generalized weakness /unsteady gait  Bradycardia  HTN  DM  recent CVA/infarct in the left globus pallidus/posterior limb internal capsule   History of a flutter with bradycardia  History of hypocalcemia  Hypothyroid  sickle cell disease  Hypercholesterolemia  history of prostate cancer/status post prostatectomy    Admit patient to inpatient telemetry unit  Consult cardiology  Obtain labs: CBC, CMP  Ordered UA urine culture  PT OT consult    Continue home meds:  Eliquis 2.5 mg twice daily  Tylenol 500 mg as needed  Hydroxyurea 500 mg daily  Atorvastatin 80 mg daily  Metformin 500 mg twice daily  Losartan 50 daily  B12 1000 MCG daily  Levothyroxine 150 MCG daily  Sliding-scale insulin          Medical Decision Making:   I personally reviewed labs:   I personally reviewed imaging:  Toxic drug monitoring:     Discussed case with: ED provider. After discussion I am in agreement that acuity of patient's medical condition necessitates hospital stay.      Code Status: DNR/DNI  DVT Prophylaxis: Patient on Eliquis  GI Prophylaxis: None      Subjective:   CHIEF COMPLAINT: Right-sided weakness and bradycardia    HISTORY OF PRESENT ILLNESS:     Micha Thayer is a 89 y.o.  male with PMHx significant for HTN ,DM, recent CVA, History of a flutter with bradycardia, History of hypocalcemia,

## 2025-04-23 NOTE — PLAN OF CARE
Speech LAnguage Pathology Dysphagia EVALUATION    Patient: Micha Thayer (89 y.o. male)  Date: 4/23/2025  Primary Diagnosis: Bradycardia [R00.1]  General weakness [R53.1]  Muscular deconditioning [R29.898]  Atrial flutter, unspecified type (HCC) [I48.92]       Precautions: Aspiration Fall Risk, Bed Alarm                DIET RECOMMENDATIONS: Minced and moist and thin liquids, meds crushed if able in applesauce or pudding,    SWALLOW SAFETY PRECAUTIONS: ONLY Feed when alert, check for pocketing, cues to expectorate if pocketing, suctioning in room, alternate temps/textures, slow rate and remain upright 1 hour after PO.    ASSESSMENT :  Based on the objective data described below, the patient presents with moderate oral dysphagia and concerns cognitive deficits negatively impacting PO intake.  Notified by RN patient pocketing PO intake.  Upon entry into room patient w/ large volume applesauce w/ PO meds he declines to swallow and expectorates. MD and RN notified.  Patient accepting of thin liquids w/ increased oral transit time. W/ small sips there is no bolus holding however w/ larger volumes bolus holding noted able to expectorate w/ cues.   Concerns for PO tolerance and ability to meet nutritional needs via PO. Discussed w/ MD, place on LRD w/ strict aspiration precautions.   Patient high risk for aspiration.   GOALS:    Problem: SLP Adult - Impaired Swallowing  Goal: By Discharge: Advance to least restrictive diet without signs or symptoms of aspiration for planned discharge setting.  See evaluation for individualized goals.  Description: Speech Therapy Swallow Goals  Initiated 4/23/2025  -Patient will tolerate minced and moist diet with thin liquids without clinical indicators of aspiration given no cues within 7 day(s).        -Patient will tolerate PO trials without clinical indicators of aspiration given no cues within 7 day(s).      -Patient will participate in modified barium swallow study within 7

## 2025-04-23 NOTE — ED NOTES
ED TO INPATIENT SBAR HANDOFF    Patient Name: Micha Thayer   Preferred Name: Micha  : 9/15/1934  89 y.o.   Family/Caregiver Present: no   Code Status Order: DNR  PO Status: NPO:No  Telemetry Order: Yes  C-SSRS: Risk of Suicide: No Risk  Sitter no   Restraints:     Sepsis Risk Score Sepsis V2 Risk Score: 19.8    Situation  Chief Complaint   Patient presents with    Extremity Weakness     Brief Description of Patient's Condition:   Client home health nurse reports having weakness in r.  Side of body today since this am, client has recent CVA with l. Side 1 month ago.  Recently home from rehab. NAD. AXOX4.               Mental Status: oriented, alert, coherent, logical, thought processes intact, and able to concentrate and follow conversation  Arrived from:Home  Imaging:   XR CHEST PORTABLE   Final Result   No acute intrathoracic process is identified.             Electronically signed by ANA CRUZ      CT HEAD WO CONTRAST   Final Result   No acute intracranial process identified            Electronically signed by Beverly Ames        Abnormal labs:   Abnormal Labs Reviewed   CBC WITH AUTO DIFFERENTIAL - Abnormal; Notable for the following components:       Result Value    RBC 3.52 (*)     .5 (*)     MCH 35.8 (*)     Neutrophils % 76.6 (*)     Immature Granulocytes Absolute 0.05 (*)     All other components within normal limits   COMPREHENSIVE METABOLIC PANEL - Abnormal; Notable for the following components:    Anion Gap 1 (*)     Glucose 178 (*)     BUN 21 (*)     BUN/Creatinine Ratio 22 (*)     Albumin 3.2 (*)     Globulin 4.3 (*)     Albumin/Globulin Ratio 0.7 (*)     All other components within normal limits   COMPREHENSIVE METABOLIC PANEL - Abnormal; Notable for the following components:    Chloride 110 (*)     Glucose 159 (*)     BUN/Creatinine Ratio 23 (*)     Albumin 3.2 (*)     Globulin 4.2 (*)     Albumin/Globulin Ratio 0.8 (*)     All other components within normal limits

## 2025-04-23 NOTE — PROGRESS NOTES
4 Eyes Skin Assessment     NAME:  Micha Thayer  YOB: 1934  MEDICAL RECORD NUMBER:  [id number]    The patient is being assessed for  Other weekly skin assessment    I agree that at least one RN has performed a thorough Head to Toe Skin Assessment on the patient. ALL assessment sites listed below have been assessed.      Areas assessed by both nurses:    Head, Face, Ears, Shoulders, Back, Chest, Arms, Elbows, Hands, Sacrum. Buttock, Coccyx, Ischium, Legs. Feet and Heels, and Under Medical Devices         Does the Patient have a Wound? No noted wound(s)       Stanley Prevention initiated by RN: Yes  Wound Care Orders initiated by RN: No    Pressure Injury (Stage 3,4, Unstageable, DTI, NWPT, and Complex wounds) if present, place Wound referral order by RN under : No    New Ostomies, if present place, Ostomy referral order under : No     Nurse 1 eSignature: Electronically signed by Patrick Donovan RN on 4/23/25 at 6:49 AM EDT    **SHARE this note so that the co-signing nurse can place an eSignature**    Nurse 2 eSignature: Electronically signed by Lisy Goldman RN on 4/23/25 at 4:32 PM EDT

## 2025-04-23 NOTE — CONSULTS
CARDIOLOGY CONSULTATION    REASON FOR CONSULT: Bradycardia     REQUESTING PROVIDER: Dr. Flores     CHIEF COMPLAINT:  Generalized weakness     HISTORY OF PRESENT ILLNESS:  Micha Thayer is a 89 y.o. year-old male with past medical history significant for recent CVA, atrial flutter on Eliquis, diabetes, hypertension, hyperlipidemia, prostate cancer s/p prostatectomy, hypothyroidism, and sickle cell disease who was evaluated today due to bradycardia. Patient presented to the ED with chief complaint of generalized weakness, inability to walk, and poor oral intake. Of note, patient was recently admitted here for acute CVA (discharged on 4/3/2025). Found to have new onset atrial flutter, and was initiated on Eliquis. Patient was discharged to [facility] rehab. He was discharged from rehab this past Thursday and was reportedly doing well at home until yesterday. Family endorses he was overall well, and not eating or drinking well. Patient seen today, sitting upright in bed. Denies chest pain and shortness of breath. Offers no complaints.     Records from hospital admission course thus far reviewed.      Telemetry reviewed.       INPATIENT MEDICATIONS:  Home medications reviewed.    Current Facility-Administered Medications:     guaiFENesin (ROBITUSSIN) 100 MG/5ML liquid 200 mg, 200 mg, Oral, Q4H PRN, Leslye Flores MD    apixaban (ELIQUIS) tablet 2.5 mg, 2.5 mg, Oral, BID, Leslye Flores MD, 2.5 mg at 04/23/25 0841    atorvastatin (LIPITOR) tablet 80 mg, 80 mg, Oral, Nightly, Leslye Flores MD, 80 mg at 04/22/25 2111    hydroxyurea (HYDREA) chemo capsule 500 mg, 500 mg, Oral, Daily, Leslye Flores MD, 500 mg at 04/23/25 0841    levothyroxine (SYNTHROID) tablet 150 mcg, 150 mcg, Oral, Daily, Leslye Flores MD, 150 mcg at 04/23/25 0546    metFORMIN (GLUCOPHAGE) tablet 500 mg, 500 mg, Oral, BID WC, Leslye Flores MD, 500 mg at 04/23/25 0840    losartan (COZAAR) tablet 50 mg, 50 mg, Oral, Daily,  no heavy alcohol or illicit drug use.      REVIEW OF SYSTEMS:  Complete review of systems performed, pertinents noted above, all other systems are negative.    PHYSICAL EXAMINATION:    General:  Alert  Cardiovascular:  irregularly irregular rhythm, No murmur  Respiratory:  Lungs are clear  Abdomen:  Soft, nontender  Extremities:  No lower extremity edema  Skin:  Dry, warm  Psych:  Normal affect      Vitals:    04/23/25 1105   BP: 130/64   Pulse: (!) 47   Resp: 15   Temp:    SpO2: 100%       Recent labs results and imaging reviewed.     Discussed case with Dr. Weiner and our impression and recommendations are as follows:  Atrial flutter,   Slow ventricular response, rates 32-65  Rates during recent admission were similar  Would avoid AV jackson blocking agents  CHADsVASC score of 5. Continue Eliquis 2.5mg PO BID   Recent echo 4/2025 with EF 50-55%  Continue telemetry   Keep electrolytes WNL, K+ 4-5, Mg >2   Family has made patient a DNR, they are not interested in pursuing invasive measures. Family does not want a PPM.   Hypertension, BP now stable. Continue Losartan   Hyperlipidemia, continue statin   Recent CVA, MRI brain with small acute infarct in the left globus allidus/posterior limb internal capsule. Continue statin   Diabetes, HgbA1c 7.3 recently, per primary team   Hypothyroidism, TSH 2.14, T4 1.5. Per primary team     Thank you for involving us in the care of this patient.  Please do not hesitate to call me or Dr. Weiner if additional questions arise.    UCHE HOLLINS, APRN - NP  4/23/2025

## 2025-04-23 NOTE — PROGRESS NOTES
Hospitalist Progress Note               Daily Progress Note: 4/23/2025      Hospital Day: 2     Chief complaint:   Chief Complaint   Patient presents with    Extremity Weakness        Subjective:   Hospital course to date:    Micha Thayer is a 89 y.o.  male with PMHx significant for HTN ,DM, recent CVA, History of a flutter with bradycardia, History of hypocalcemia, Hypothyroid, sickle cell disease, Hypercholesterolemia, history of prostate cancer/status post prostatectomy  Patient presents to the ER today for generalized weakness and unable to ambulate.  According to family member present in the room, patient had a stroke about 3 weeks ago.  Patient was discharged on 4/1/2025 to inpatient rehab for an acute CVA   Patient was placed on Eliquis 2.5 mg twice daily due to [id number] score of 5. patient had an acute infarct in the left globus pallidus/posterior limb internal capsule. Patient was discharged to [facility] for rehab until this Thursday, then patient was discharged home and doing well until the day he came in.  Patient was not eating or drinking well, unable to walk and felt very weak and tired.    In the ED, head CT was negative.  Blood work unremarkable.  Heart rate was only 40.  Patient does not take any negatively chronotropic medications      --------  Patient is seen today for follow-up.  Overnight heart rate has persisted in the mid 30s.    His daughter is at the bedside.  Family has requested DNR CODE STATUS.  They are not interested in pursuing a permanent pacemaker.        Medications reviewed  Current Facility-Administered Medications   Medication Dose Route Frequency    guaiFENesin (ROBITUSSIN) 100 MG/5ML liquid 200 mg  200 mg Oral Q4H PRN    apixaban (ELIQUIS) tablet 2.5 mg  2.5 mg Oral BID    atorvastatin (LIPITOR) tablet 80 mg  80 mg Oral Nightly    hydroxyurea (HYDREA) chemo capsule 500 mg  500 mg Oral Daily    levothyroxine (SYNTHROID) tablet 150 mcg  150 mcg Oral Daily    metFORMIN

## 2025-04-23 NOTE — PATIENT CARE CONFERENCE
4/23/2025    Discussed goals of care with patient's wife and daughter.  They are hoping patient would go back to [facility] but his bradycardia will preclude that in addition, he was just discharged from their last week and they will not take him back    In view of his bradycardia and overall failure to thrive, not eating, swallowing issues I recommended hospice and they are agreeable.    Case management sent referral, plan on discharge home with hospice tomorrow                  Alcides Vieyra MD

## 2025-04-23 NOTE — PLAN OF CARE
Problem: Chronic Conditions and Co-morbidities  Goal: Patient's chronic conditions and co-morbidity symptoms are monitored and maintained or improved  Outcome: Progressing     Problem: Discharge Planning  Goal: Discharge to home or other facility with appropriate resources  Outcome: Progressing     Problem: Skin/Tissue Integrity  Goal: Skin integrity remains intact  Description: 1.  Monitor for areas of redness and/or skin breakdown2.  Assess vascular access sites hourly3.  Every 4-6 hours minimum:  Change oxygen saturation probe site4.  Every 4-6 hours:  If on nasal continuous positive airway pressure, respiratory therapy assess nares and determine need for appliance change or resting period  Outcome: Progressing  Flowsheets (Taken 4/22/2025 2300)  Skin Integrity Remains Intact: Monitor for areas of redness and/or skin breakdown     Problem: Safety - Adult  Goal: Free from fall injury  Outcome: Progressing     Problem: ABCDS Injury Assessment  Goal: Absence of physical injury  Outcome: Progressing

## 2025-04-24 LAB
GLUCOSE BLD STRIP.AUTO-MCNC: 66 MG/DL (ref 65–100)
GLUCOSE BLD STRIP.AUTO-MCNC: 66 MG/DL (ref 65–100)
GLUCOSE BLD STRIP.AUTO-MCNC: 82 MG/DL (ref 65–100)
GLUCOSE BLD STRIP.AUTO-MCNC: 83 MG/DL (ref 65–100)
GLUCOSE BLD STRIP.AUTO-MCNC: 87 MG/DL (ref 65–100)
GLUCOSE BLD STRIP.AUTO-MCNC: 93 MG/DL (ref 65–100)
PERFORMED BY:: NORMAL

## 2025-04-24 PROCEDURE — 2580000003 HC RX 258: Performed by: FAMILY MEDICINE

## 2025-04-24 PROCEDURE — 82962 GLUCOSE BLOOD TEST: CPT

## 2025-04-24 PROCEDURE — 6370000000 HC RX 637 (ALT 250 FOR IP): Performed by: FAMILY MEDICINE

## 2025-04-24 PROCEDURE — 2060000000 HC ICU INTERMEDIATE R&B

## 2025-04-24 PROCEDURE — 2500000003 HC RX 250 WO HCPCS: Performed by: FAMILY MEDICINE

## 2025-04-24 RX ADMIN — HYDROXYUREA 500 MG: 500 CAPSULE ORAL at 09:28

## 2025-04-24 RX ADMIN — APIXABAN 2.5 MG: 2.5 TABLET, FILM COATED ORAL at 09:29

## 2025-04-24 RX ADMIN — ATORVASTATIN CALCIUM 80 MG: 40 TABLET, FILM COATED ORAL at 21:36

## 2025-04-24 RX ADMIN — CYANOCOBALAMIN TAB 1000 MCG 1000 MCG: 1000 TAB at 09:28

## 2025-04-24 RX ADMIN — LEVOTHYROXINE SODIUM 150 MCG: 0.07 TABLET ORAL at 05:55

## 2025-04-24 RX ADMIN — METFORMIN HYDROCHLORIDE 500 MG: 500 TABLET ORAL at 09:28

## 2025-04-24 RX ADMIN — LOSARTAN POTASSIUM 50 MG: 50 TABLET, FILM COATED ORAL at 09:28

## 2025-04-24 RX ADMIN — DEXTROSE 125 ML: 10 SOLUTION INTRAVENOUS at 17:33

## 2025-04-24 RX ADMIN — APIXABAN 2.5 MG: 2.5 TABLET, FILM COATED ORAL at 21:36

## 2025-04-24 RX ADMIN — DEXTROSE 125 ML: 10 SOLUTION INTRAVENOUS at 18:13

## 2025-04-24 RX ADMIN — SODIUM CHLORIDE, PRESERVATIVE FREE 10 ML: 5 INJECTION INTRAVENOUS at 21:36

## 2025-04-24 RX ADMIN — SODIUM CHLORIDE, PRESERVATIVE FREE 10 ML: 5 INJECTION INTRAVENOUS at 09:30

## 2025-04-24 ASSESSMENT — PAIN SCALES - GENERAL
PAINLEVEL_OUTOF10: 0

## 2025-04-24 NOTE — PROGRESS NOTES
CM in to speak with daughter at bedside. Patient opens eyes and responds by smiling. CM notified patient daughter of discharge later today.     1150 am  CM spoke to hospice liaison about delivery of DME. Wife has cancelled delivery and says her  is not coming home until tomorrow. CM spoke to CM supervisor and informed of above.     1530 pm  Wife in patient room and states her daughter went home to prepare for DME delivery. Patient will discharge home at noon tomorrow by ambulance.

## 2025-04-24 NOTE — DISCHARGE SUMMARY
Physician Discharge Summary     Patient ID:    Micha Thayer  [id number]  89 y.o.  9/15/1934    Admit date: 4/22/2025    Discharge date : 4/25/2025      Final Diagnoses:   Bradycardia [R00.1]  General weakness [R53.1]  Muscular deconditioning [R29.898]  Atrial flutter, unspecified type (HCC) [I48.92]  Adult failure to thrive  Slow atrial flutter  Secondary hypercoagulable state  History of stroke  Hypothyroidism  Hyperlipidemia  Diabetes mellitus type 2    Reason for Hospitalization:  Micha Thayer is a 89 y.o.  male with PMHx significant for HTN ,DM, recent CVA, History of a flutter with bradycardia, History of hypocalcemia, Hypothyroid, sickle cell disease, Hypercholesterolemia, history of prostate cancer/status post prostatectomy  Patient presents to the ER today for generalized weakness and unable to ambulate.  According to family member present in the room, patient had a stroke about 3 weeks ago.  Patient was discharged on 4/1/2025 to inpatient rehab for an acute CVA   Patient was placed on Eliquis 2.5 mg twice daily due to [id number] score of 5. patient had an acute infarct in the left globus pallidus/posterior limb internal capsule. Patient was discharged to [facility] for rehab until this Thursday, then patient was discharged home and doing well until the day he came in.  Patient was not eating or drinking well, unable to walk and felt very weak and tired.     In the ED, head CT was negative.  Blood work unremarkable.  Heart rate was only 40.  Patient does not take any negatively chronotropic medications      Hospital Course:   Patient was admitted to cardiac telemetry.    Discussed with his wife and daughter.  They had no interest in pursuing a permanent pacemaker.  Patient's heart rate remained in the 30 and 40 range.    Discussed goals of care with patient's wife and daughter.  They are hoping patient would go back to [facility] but his bradycardia will preclude that in addition, he was    WBC 9.3 8.8   HGB 12.6 11.5*   HCT 38.2 35.1*    168     Recent Labs     04/22/25  1641 04/22/25  1739 04/22/25  2133 04/22/25  2234    143  --  145   K Hemolyzed, Recollection Recommended 3.6  --  3.8    110*  --  112*   CO2 30 30  --  31   BUN 21* 20  --  18   CREATININE 0.97 0.88  --  0.75   GLUCOSE 178* 159* 84 89   CALCIUM 8.8 9.2  --  8.6   MG Hemolyzed, Recollection Recommended 2.1  --   --      Recent Labs     04/22/25  1641 04/22/25  1739 04/22/25  2234   AST Hemolyzed, Recollection Recommended 24 21   ALT Hemolyzed, Recollection Recommended 37 36   ALKPHOS 86 86 74   BILITOT 0.6 0.6 0.6   GLOB 4.3* 4.2* 3.7     No results for input(s): \"INR\", \"PROTIME\", \"APTT\" in the last 72 hours.   No results for input(s): \"IRON\", \"TIBC\" in the last 72 hours.    Invalid input(s): \"PSAT\", \"FERR\"   No results for input(s): \"PH\", \"PCO2\", \"PO2\" in the last 72 hours.  No results for input(s): \"CKTOTAL\", \"CKMB\", \"TROPONINI\" in the last 72 hours.  Lab Results   Component Value Date/Time    POCGLU 87 04/24/2025 07:25 AM    POCGLU 75 04/23/2025 08:35 PM    POCGLU 107 04/23/2025 11:07 AM    POCGLU 106 04/23/2025 08:11 AM    POCGLU 79 04/23/2025 05:48 AM         Discharge time spent 35 minutes    Signed:  Alcides Vieyra MD  4/24/2025  9:36 AM

## 2025-04-24 NOTE — PLAN OF CARE
Problem: Chronic Conditions and Co-morbidities  Goal: Patient's chronic conditions and co-morbidity symptoms are monitored and maintained or improved  Outcome: Progressing  Flowsheets (Taken 4/23/2025 2035 by Monica Sanford, RN)  Care Plan - Patient's Chronic Conditions and Co-Morbidity Symptoms are Monitored and Maintained or Improved:   Monitor and assess patient's chronic conditions and comorbid symptoms for stability, deterioration, or improvement   Collaborate with multidisciplinary team to address chronic and comorbid conditions and prevent exacerbation or deterioration     Problem: Discharge Planning  Goal: Discharge to home or other facility with appropriate resources  Outcome: Progressing  Flowsheets (Taken 4/23/2025 2035 by Monica Sanford, RN)  Discharge to home or other facility with appropriate resources: Identify barriers to discharge with patient and caregiver     Problem: Skin/Tissue Integrity  Goal: Skin integrity remains intact  Description: 1.  Monitor for areas of redness and/or skin breakdown2.  Assess vascular access sites hourly3.  Every 4-6 hours minimum:  Change oxygen saturation probe site4.  Every 4-6 hours:  If on nasal continuous positive airway pressure, respiratory therapy assess nares and determine need for appliance change or resting period  Outcome: Progressing     Problem: Safety - Adult  Goal: Free from fall injury  4/24/2025 1016 by Zainab Hood, RN  Outcome: Progressing  4/24/2025 0205 by Monica Sanford, RN  Outcome: Progressing     Problem: ABCDS Injury Assessment  Goal: Absence of physical injury  Outcome: Progressing

## 2025-04-25 VITALS
RESPIRATION RATE: 18 BRPM | BODY MASS INDEX: 22.66 KG/M2 | HEART RATE: 66 BPM | HEIGHT: 67 IN | SYSTOLIC BLOOD PRESSURE: 143 MMHG | OXYGEN SATURATION: 94 % | WEIGHT: 144.4 LBS | DIASTOLIC BLOOD PRESSURE: 81 MMHG | TEMPERATURE: 97.3 F

## 2025-04-25 PROBLEM — E43 SEVERE PROTEIN-CALORIE MALNUTRITION: Status: ACTIVE | Noted: 2025-04-25

## 2025-04-25 LAB
GLUCOSE BLD STRIP.AUTO-MCNC: 89 MG/DL (ref 65–100)
PERFORMED BY:: NORMAL

## 2025-04-25 PROCEDURE — 2500000003 HC RX 250 WO HCPCS: Performed by: FAMILY MEDICINE

## 2025-04-25 PROCEDURE — 82962 GLUCOSE BLOOD TEST: CPT

## 2025-04-25 PROCEDURE — 6370000000 HC RX 637 (ALT 250 FOR IP): Performed by: FAMILY MEDICINE

## 2025-04-25 RX ADMIN — SODIUM CHLORIDE, PRESERVATIVE FREE 10 ML: 5 INJECTION INTRAVENOUS at 10:06

## 2025-04-25 RX ADMIN — APIXABAN 2.5 MG: 2.5 TABLET, FILM COATED ORAL at 10:00

## 2025-04-25 RX ADMIN — LEVOTHYROXINE SODIUM 150 MCG: 0.07 TABLET ORAL at 05:42

## 2025-04-25 RX ADMIN — CYANOCOBALAMIN TAB 1000 MCG 1000 MCG: 1000 TAB at 10:00

## 2025-04-25 RX ADMIN — HYDROXYUREA 500 MG: 500 CAPSULE ORAL at 10:01

## 2025-04-25 RX ADMIN — LOSARTAN POTASSIUM 50 MG: 50 TABLET, FILM COATED ORAL at 10:00

## 2025-04-25 RX ADMIN — METFORMIN HYDROCHLORIDE 500 MG: 500 TABLET ORAL at 09:59

## 2025-04-25 ASSESSMENT — PAIN SCALES - GENERAL
PAINLEVEL_OUTOF10: 0
PAINLEVEL_OUTOF10: 0

## 2025-04-25 NOTE — PROGRESS NOTES
Patient has discharge orders home with hospice for today. Patient family aware of discharge. Transport scheduled to 1200pm. Patient is stable, alert and mentation are at baseline. Patient is discharging without an IV, tele or vega.     Discharge plan of care/case management plan validated with provider discharge order.

## 2025-04-25 NOTE — PROGRESS NOTES
Patient will discharge today at 12 pm and transport home with hospice by ambulance.     Transition of Care Plan:    RUR: 15%  Prior Level of Functioning: dependent with some ADLs  Disposition: home with hospice  MILENA: today  If SNF or IPR: Date FOC offered: n/a  Date FOC received: n/a  Accepting facility: n/a  Date authorization started with reference number: n/a  Date authorization received and expires: n/a  Follow up appointments: n/a  DME needed: n/a  Transportation at discharge: ambulance  IM/IMM Medicare/ letter given: n/a  Is patient a [place] and connected with [facility]? N/a   If yes, was [place] transfer form completed and [facility] notified?   Caregiver Contact: wife  Discharge Caregiver contacted prior to discharge? yes  Care Conference needed? N/a  Barriers to discharge:n/a

## 2025-04-25 NOTE — PLAN OF CARE
Problem: Chronic Conditions and Co-morbidities  Goal: Patient's chronic conditions and co-morbidity symptoms are monitored and maintained or improved  Outcome: Progressing     Problem: Discharge Planning  Goal: Discharge to home or other facility with appropriate resources  4/25/2025 1022 by Zainab Hood, RN  Outcome: Progressing  4/25/2025 0050 by Monica Sanford, RN  Outcome: Progressing  Flowsheets (Taken 4/24/2025 2005)  Discharge to home or other facility with appropriate resources: Identify barriers to discharge with patient and caregiver     Problem: Skin/Tissue Integrity  Goal: Skin integrity remains intact  Description: 1.  Monitor for areas of redness and/or skin breakdown2.  Assess vascular access sites hourly3.  Every 4-6 hours minimum:  Change oxygen saturation probe site4.  Every 4-6 hours:  If on nasal continuous positive airway pressure, respiratory therapy assess nares and determine need for appliance change or resting period  Outcome: Progressing     Problem: Safety - Adult  Goal: Free from fall injury  Outcome: Progressing     Problem: ABCDS Injury Assessment  Goal: Absence of physical injury  Outcome: Progressing

## 2025-04-25 NOTE — PROGRESS NOTES
Comprehensive Nutrition Assessment    Type and Reason for Visit:  Initial, Positive nutrition screen    Nutrition Recommendations/Plan:   Continue Current Diet  Encourage PO intakes >50%  Monitor/document wt, BM, PO+ONS% in I/O  Add Ensure HC HP TID (1050 kcals, 60 gr prot)        Malnutrition Assessment:  Malnutrition Status:  Severe malnutrition (04/25/25 1221)    Context:  Acute Illness     Findings of the 6 clinical characteristics of malnutrition:  Energy Intake:  75% or less of estimated energy requirements for 7 or more days  Weight Loss:  Greater than 7.5% over 3 months     Body Fat Loss:  Moderate body fat loss Buccal region, Triceps   Muscle Mass Loss:  Moderate muscle mass loss Scapula (trapezius), Hand (interosseous), Clavicles (pectoralis & deltoids), Temples (temporalis)  Fluid Accumulation:  No fluid accumulation     Strength:  Not Performed    Nutrition Assessment:    Patient screened for low BMI for age, MST. Drinks Ensure Max at home, family reports weight loss since March. Review of EMR  show a 27% weight loss since 2/2025. Receives a M&M diet, intake poor at 0% per family. Labs reviewed, meds include Vit B and insulin. Will add Jen ONS to diet order. Monitor po intake.    Nutrition Related Findings:    NFPE with severe findings Wound Type: None       Current Nutrition Intake & Therapies:    Average Meal Intake: 0%  Average Supplements Intake: None Ordered  ADULT DIET; Dysphagia - Minced and Moist; Low Fat/Low Chol/High Fiber/2 gm Na    Anthropometric Measures:  Height: 170.2 cm (5' 7.01\")  Ideal Body Weight (IBW): 148 lbs (67 kg)    Admission Body Weight: 65.5 kg (144 lb 6.4 oz)  Current Body Weight: 65.5 kg (144 lb 6.4 oz), 97.6 % IBW. Weight Source: Bed scale  Current BMI (kg/m2): 22.6  Usual Body Weight: 89.8 kg (197 lb 15.6 oz)     % Weight Change (Calculated): -27.1                    BMI Categories: Underweight (BMI less than 22) age over 65    Estimated Daily Nutrient Needs:  Energy

## 2025-04-28 NOTE — PROGRESS NOTES
Hospitalist Progress Note               Daily Progress Note: 4/24/2025      Hospital Day: 4     Chief complaint:   Chief Complaint   Patient presents with    Extremity Weakness        Subjective:   Hospital course to date:    Micha Thayer is a 89 y.o.  male with PMHx significant for HTN ,DM, recent CVA, History of a flutter with bradycardia, History of hypocalcemia, Hypothyroid, sickle cell disease, Hypercholesterolemia, history of prostate cancer/status post prostatectomy  Patient presents to the ER today for generalized weakness and unable to ambulate.  According to family member present in the room, patient had a stroke about 3 weeks ago.  Patient was discharged on 4/1/2025 to inpatient rehab for an acute CVA   Patient was placed on Eliquis 2.5 mg twice daily due to [id number] score of 5. patient had an acute infarct in the left globus pallidus/posterior limb internal capsule. Patient was discharged to [facility] for rehab until this Thursday, then patient was discharged home and doing well until the day he came in.  Patient was not eating or drinking well, unable to walk and felt very weak and tired.    In the ED, head CT was negative.  Blood work unremarkable.  Heart rate was only 40.  Patient does not take any negatively chronotropic medications    I met with patient's family on 4/23 and they were agreeable to hospice    --------  Patient is seen today for follow-up.   No changes.  I was anticipating discharge home today although family canceled delivery of the DME equipment from hospice and wanted to wait until tomorrow.      Please note this is a late dictation.  Patient was seen and examined on 4/24        Medications reviewed  No current facility-administered medications for this encounter.     Current Outpatient Medications   Medication Sig    sennosides-docusate sodium (SENOKOT-S) 8.6-50 MG tablet Take 1 tablet by mouth in the morning and at bedtime Indications: Constipation    apixaban (ELIQUIS)
